# Patient Record
Sex: FEMALE | Race: WHITE | Employment: OTHER | URBAN - METROPOLITAN AREA
[De-identification: names, ages, dates, MRNs, and addresses within clinical notes are randomized per-mention and may not be internally consistent; named-entity substitution may affect disease eponyms.]

---

## 2020-09-09 ENCOUNTER — OFFICE VISIT (OUTPATIENT)
Dept: NEUROLOGY | Age: 70
End: 2020-09-09

## 2020-09-09 ENCOUNTER — OFFICE VISIT (OUTPATIENT)
Dept: NEUROLOGY | Age: 70
End: 2020-09-09
Payer: MEDICARE

## 2020-09-09 VITALS
BODY MASS INDEX: 37.85 KG/M2 | SYSTOLIC BLOOD PRESSURE: 146 MMHG | WEIGHT: 192.8 LBS | TEMPERATURE: 98.1 F | RESPIRATION RATE: 16 BRPM | OXYGEN SATURATION: 96 % | HEART RATE: 82 BPM | DIASTOLIC BLOOD PRESSURE: 92 MMHG | HEIGHT: 60 IN

## 2020-09-09 DIAGNOSIS — Z79.01 CHRONIC ANTICOAGULATION: ICD-10-CM

## 2020-09-09 DIAGNOSIS — R25.9 ABNORMAL INVOLUNTARY MOVEMENT: Primary | ICD-10-CM

## 2020-09-09 DIAGNOSIS — Z86.73 HISTORY OF STROKE: ICD-10-CM

## 2020-09-09 DIAGNOSIS — R26.89 IMBALANCE: ICD-10-CM

## 2020-09-09 DIAGNOSIS — E66.01 SEVERE OBESITY (HCC): ICD-10-CM

## 2020-09-09 DIAGNOSIS — R26.9 GAIT ABNORMALITY: ICD-10-CM

## 2020-09-09 DIAGNOSIS — R41.3 MEMORY LOSS: ICD-10-CM

## 2020-09-09 PROCEDURE — 99205 OFFICE O/P NEW HI 60 MIN: CPT | Performed by: PSYCHIATRY & NEUROLOGY

## 2020-09-09 RX ORDER — TOPIRAMATE 100 MG/1
100 TABLET, FILM COATED ORAL
Qty: 30 TAB | Refills: 3 | Status: SHIPPED | OUTPATIENT
Start: 2020-09-09 | End: 2020-10-12 | Stop reason: SINTOL

## 2020-09-09 RX ORDER — ALENDRONATE SODIUM 70 MG/1
70 TABLET ORAL
COMMUNITY

## 2020-09-09 RX ORDER — TOPIRAMATE 25 MG/1
TABLET ORAL
Qty: 42 TAB | Refills: 0 | Status: SHIPPED | OUTPATIENT
Start: 2020-09-09 | End: 2020-10-12 | Stop reason: DRUGHIGH

## 2020-09-09 RX ORDER — OMEPRAZOLE 10 MG/1
10 CAPSULE, DELAYED RELEASE ORAL 2 TIMES DAILY
COMMUNITY

## 2020-09-09 NOTE — PATIENT INSTRUCTIONS
RESULT POLICY If we have ordered testing for you, know that; \"NO NEWS IS GOOD NEWS! \" It is our policy that we know longer call patients with results, nor do we  give test results over the phone. We schedule follow up appointments so that your results can be discussed in person. This allows you to address any questions you have regarding the results. If you choose to go to an imaging center outside of [facility], it is your responsibility to bring imaging report and disc to follow up appointment. If something of concern is revealed on your test, we will contact you to discuss the matter and if needed schedule a sooner follow up appointment. Additionally, results may be found by using the My Chart feature and one of our patient service representatives at the  can give you instructions on how to access this feature to utilize our electronic medical record system. Thank you for your understanding. PRESCRIPTION REFILL POLICY [facility] Statement to Patients April 1, 2014 In an effort to ensure the large volume of patient prescription refills is processed in the most efficient and expeditious manner, we are asking our patients to assist us by calling your Pharmacy for all prescription refills, this will include also your  Mail Order Pharmacy. The pharmacy will contact our office electronically to continue the refill process. Please do not wait until the last minute to call your pharmacy. We need at least 48 hours (2days) to fill prescriptions. We also encourage you to call your pharmacy before going to  your prescription to make sure it is ready. With regard to controlled substance prescription refill requests (narcotic refills) that need to be picked up at our office, we ask your cooperation by providing us with at least 72 hours (3days) notice that you will need a refill. We will not refill narcotic prescription refill requests after 4:00pm on any weekday, Monday through Thursday, or after 2:00pm on Fridays, or on the weekends. We encourage everyone to explore another way of getting your prescription refill request processed using [employer], our patient web portal through our electronic medical record system. [employer] is an efficient and effective way to communicate your medication request directly to the office and  downloadable as an kiana on your smart phone . [employer] also features a review functionality that allows you to view your medication list as well as leave messages for your physician. Are you ready to get connected? If so please review the attatched instructions or speak to any of our staff to get you set up right away! Thank you so much for your cooperation. Should you have any questions please contact our Practice Administrator.

## 2020-09-09 NOTE — LETTER
9/9/20 Patient: Lisa Murray YOB: 1950 Date of Visit: 9/9/2020 MD Uriel Abdi [address] [id number] VIA In Basket MD Uriel Gonzalezfroilan [address] [address] VIA Facsimile: [phone] Dear MD Audrey Abdi MD, Thank you for referring Ms. Lisa Murray to [facility] for evaluation. My notes for this consultation are attached. If you have questions, please do not hesitate to call me. I look forward to following your patient along with you. Sincerely, Rilla Ganser, MD

## 2020-09-09 NOTE — PROGRESS NOTES
Chief Complaint   Patient presents with    New Patient    Tremors     since 1990 after CVA. had PE in 1999 and tremor worsened.   became very bad on Sat    Cerebrovascular Accident     1990     Started in left arm, now has some tremor in right arm

## 2020-09-09 NOTE — PROGRESS NOTES
[facility] and [address] at [facility] Neurology Clinics at [facility] [address], [address] [facility] [address] [facility], [address] [place] [address]  [phone] Office  [phone] Facsimile           Referring: Dr. Mechelle Younger      Chief Complaint   Patient presents with    New Patient    Tremors     since 1990 after CVA. had PE in 1999 and tremor worsened. became very bad on Sat    Cerebrovascular Accident     200     73-year-old woman comes today for evaluation of tremor. She says that she began having tremor back in 1990 and it was felt to be secondary to a stroke--see my notes below regarding MRI. In any regard she was followed down at [facility]. Around the same time she said of had a seizure. She was started on Dilantin. After she had a pulmonary embolus in 1999 she believes that her tremor got worse. She has been on Inderal for a number of years and that is done fairly well in controlling her tremor. She has been on primidone which actually did not have much effect and may have caused some confusion. In any regard she says Saturday out of the blue all of a sudden with no inciting factor she had the abrupt worsening of her tremor. This is particularly on her left hand. She is left-handed. She is unable to eat or drink without hand. Her writing is severely affected. She spills food. She spills drink. Her right hand is also affected but not as much as the left. She has voice tremor. She did not have any anxiety provoking event. Her medicine tablets did not change color or shape. She did not miss any medicine. She saw Dr. Pawel Downey who also felt that her tremor was worse. Her blood pressure runs on the low side so they did not want to increase her dose of Inderal.  No loss of consciousness but she does say that she is having more difficulty with memory.   She became concerned when her daughter-in-law of 32 years contacted her to let her know that the patient had written a birthday check using the daughter-in-law's maiden name. She is more forgetful. No dangerous behavior. She does not forget to pay bills are paid them twice. She is writing things down. She also feels like she is not walking as well. Her thyroid is off and she has had her thyroid dose adjusted. No chest pain or palpitations. No shortness of breath. No cough fever chills nausea or vomiting    To the electronic medical record finds no recent progress notes. Back in 2010 there is a neurologic consultation by Dr. Vidal Wheeler for alteration of mental status. She at that time was said to be following with Dr. Cuong Olmstead at [facility] for essential tremor and epilepsy. Last visit was said to have been October 27, 2010 and this consultation for hospitalization was November 4, 2010. The patient's Inderal was said to have been stopped October 27 and she was started on primidone half tablet at bedtime with a schedule to titrate up to a dose of 1 tablet twice daily. Dose was unknown i.e. 50 mg or 250 mg. She was on Dilantin 200 mg twice daily with a seizure 8 years prior. On November 1 she was shopping and had uncontrolled shaking of her entire body which was different from typical seizure and this lasted about an hour and a half. She was responsive the entire time. She did not appear to be encephalopathic. MRI EEG and laboratory analyses were ordered. MRI of the brain from November 4, 2010 was normal.  EEG from the same date was normal with a 10-11 cps background. Discharge summary for that stay is in the EMR.   Past Medical History:   Diagnosis Date    Chronic pain     rsd in left foot    GERD (gastroesophageal reflux disease)     Liver disease hx of several pe,s    Other ill-defined conditions(073.20)     essential tremors    Seizures ([address]     2002    Stroke ([address] 1990    Thromboembolus ([address]     pulmary embolism    Thyroid disease hypothriodism       Past Surgical History:   Procedure Laterality Date    HX APPENDECTOMY      HX GYN      cyst on ovaries    HX HEENT      tonsils    HX LAP CHOLECYSTECTOMY         Current Outpatient Medications on File Prior to Visit   Medication Sig Dispense Refill    rivaroxaban (Xarelto) 20 mg tab tablet Take  by mouth daily.  alendronate (Fosamax) 70 mg tablet Take 70 mg by mouth every seven (7) days.  omeprazole (PRILOSEC) 20 mg capsule Take 20 mg by mouth daily.  phenytoin ER (DILANTIN EXTENDED) 100 mg ER capsule Take 100 mg by mouth. Take one cap po in am and 2 caps pm      propranolol (INDERAL) 10 mg tablet Take 10 mg by mouth two (2) times a day. Indications: ESSENTIAL TREMOR      levothyroxine (Synthroid) 125 mcg tablet Take 125 mcg by mouth Daily (before breakfast). Indications: a condition with low thyroid hormone levels      fexofenadine (ALLEGRA) 180 mg tablet Take 180 mg by mouth daily. Indications: ALLERGIC RHINITIS      calcium-vitamin D (CALCIUM 500+D) 500 mg(1,250mg) -200 unit per tablet Take 1 Tab by mouth daily.  rabeprazole (ACIPHEX) 20 mg tablet Take 1 Tab by mouth daily. 60 Tab 2     No current facility-administered medications on file prior to visit.         Allergies   Allergen Reactions    Morphine Anxiety    Penicillin G Swelling    Sulfa (Sulfonamide Antibiotics) Swelling    Valium [Diazepam] Shortness of Breath and Swelling       Social History     Tobacco Use    Smoking status: Never Smoker    Smokeless tobacco: Never Used   Substance Use Topics    Alcohol use: No    Drug use: Not on file       Family History   Problem Relation Age of Onset    Cancer Mother     Heart Disease Father        Review of Systems  Pertinent positives and negatives as noted with remainder of comprehensive review negative      Examination  Visit Vitals  BP (!) 146/92 (BP 1 Location: Right arm, BP Patient Position: Sitting)   Pulse 82   Temp 98.1 °F (36.7 °C)   Resp 16 Ht 5' (1.524 m)   Wt 87.5 kg (192 lb 12.8 oz)   SpO2 96%   BMI 37.65 kg/m²     Pleasant, well appearing. Dress and grooming are appropriate. No scleral icterus is present. Oropharynx is clear. Supple neck without bruit appreciated. Heart regular. Pulses are symmetrical.  No edema in the lower extremities. Neurologically, she is awake, alert, and oriented with normal speech and language. Her cognition is normal.    Intact cranial nerves 2-12. No nystagmus. Visual fields full to confrontation. Disk margins are flat bilaterally. She has normal bulk and tone. She has postural tremor of the outstretched hands left greater than right. There is some variability in her tremor. At times she actually will start to shake all over. She has variability to tremor of her facial muscles. She will sometimes sit on her left hand. She has no pronation or drift. She generates full strength in the upper and lower extremities to direct confrontational testing. Reflexes are symmetrical in the upper and lower extremities bilaterally. No pathologic reflexes are elicited. .  Finger nose finger and rapid alternating movements are normal.  Steady gait. No sensory deficit to primary modalities. Impression/Plan  Essential tremor--worsening suddenly  History of stroke  History of PE  History of seizure on long-term Dilantin  New complaint of memory difficulty  New complaint of gait imbalance    71-year-old lady with essential tremor worsening all of a sudden out of the blue with reported history of stroke and history of PEs/abnormal clotting coagulation and we will go ahead and get an MRI of the brain as well as a carotid Doppler to evaluate. She also has history of seizure on long-term Dilantin since 1990 or 1994. Check EEG as well. Will continue her Inderal.  Add Topamax in a customary fashion titrating up to 100 mg.  Shantel potential side effects, potential benefits and alternatives.   She will follow in about 6 weeks and we will escalate the Topamax accordingly. Rambo Westbrook MD    This note was created using voice recognition software. Despite editing, there may be syntax errors. This note will not be viewable in [address]

## 2020-09-10 ENCOUNTER — DOCUMENTATION ONLY (OUTPATIENT)
Dept: NEUROLOGY | Age: 70
End: 2020-09-10

## 2020-09-10 NOTE — PROCEDURES
EEG:      Date:  09/09/2020    Requesting Physician:  Katie Amaya. MD Awa    An EEG is requested in this 80-year-old woman with abnormal involuntary movements to evaluate for epileptiform abnormality. Medications:  Medications are said to include Xarelto, Fosamax, Prilosec, Topamax, AcipHex, Dilantin, Inderal, Synthroid, and Allegra. This tracing is obtained during the awake state. During wakefulness, the background consists of diffuse low-voltage fast-frequency beta wave activity. Hyperventilation is not performed due to COVID-19 precaution. Intermittent photic stimulation little alters the tracing. Sleep is not attained. Interpretation: This EEG recorded during the awake state is normal.  No epileptiform abnormalities are seen.

## 2020-09-14 ENCOUNTER — HOSPITAL ENCOUNTER (OUTPATIENT)
Dept: MRI IMAGING | Age: 70
Discharge: HOME | End: 2020-09-14
Attending: PSYCHIATRY & NEUROLOGY
Payer: MEDICARE

## 2020-09-14 DIAGNOSIS — R26.89 IMBALANCE: ICD-10-CM

## 2020-09-14 DIAGNOSIS — R25.9 ABNORMAL INVOLUNTARY MOVEMENT: ICD-10-CM

## 2020-09-14 DIAGNOSIS — R41.3 MEMORY LOSS: ICD-10-CM

## 2020-09-14 PROCEDURE — 70551 MRI BRAIN STEM W/O DYE: CPT

## 2020-10-12 ENCOUNTER — OFFICE VISIT (OUTPATIENT)
Dept: NEUROLOGY | Age: 70
End: 2020-10-12
Payer: MEDICARE

## 2020-10-12 VITALS
TEMPERATURE: 97.3 F | HEIGHT: 60 IN | OXYGEN SATURATION: 98 % | BODY MASS INDEX: 37.85 KG/M2 | SYSTOLIC BLOOD PRESSURE: 124 MMHG | HEART RATE: 58 BPM | RESPIRATION RATE: 14 BRPM | DIASTOLIC BLOOD PRESSURE: 76 MMHG | WEIGHT: 192.8 LBS

## 2020-10-12 DIAGNOSIS — Z87.898 HISTORY OF SEIZURE: ICD-10-CM

## 2020-10-12 DIAGNOSIS — G25.0 ESSENTIAL TREMOR: Primary | ICD-10-CM

## 2020-10-12 DIAGNOSIS — Z79.01 CHRONIC ANTICOAGULATION: ICD-10-CM

## 2020-10-12 DIAGNOSIS — T50.905A ADVERSE EFFECT DUE TO CORRECT MEDICINAL SUBSTANCE, PROPERLY GIVEN, INITIAL ENCOUNTER: ICD-10-CM

## 2020-10-12 PROCEDURE — 3017F COLORECTAL CA SCREEN DOC REV: CPT | Performed by: PSYCHIATRY & NEUROLOGY

## 2020-10-12 PROCEDURE — G8427 DOCREV CUR MEDS BY ELIG CLIN: HCPCS | Performed by: PSYCHIATRY & NEUROLOGY

## 2020-10-12 PROCEDURE — 1090F PRES/ABSN URINE INCON ASSESS: CPT | Performed by: PSYCHIATRY & NEUROLOGY

## 2020-10-12 PROCEDURE — G8536 NO DOC ELDER MAL SCRN: HCPCS | Performed by: PSYCHIATRY & NEUROLOGY

## 2020-10-12 PROCEDURE — G8510 SCR DEP NEG, NO PLAN REQD: HCPCS | Performed by: PSYCHIATRY & NEUROLOGY

## 2020-10-12 PROCEDURE — G8417 CALC BMI ABV UP PARAM F/U: HCPCS | Performed by: PSYCHIATRY & NEUROLOGY

## 2020-10-12 PROCEDURE — 1101F PT FALLS ASSESS-DOCD LE1/YR: CPT | Performed by: PSYCHIATRY & NEUROLOGY

## 2020-10-12 PROCEDURE — G8400 PT W/DXA NO RESULTS DOC: HCPCS | Performed by: PSYCHIATRY & NEUROLOGY

## 2020-10-12 PROCEDURE — 99214 OFFICE O/P EST MOD 30 MIN: CPT | Performed by: PSYCHIATRY & NEUROLOGY

## 2020-10-12 RX ORDER — TOPIRAMATE 50 MG/1
50 TABLET, FILM COATED ORAL
Qty: 90 TAB | Refills: 3 | Status: SHIPPED | OUTPATIENT
Start: 2020-10-12 | End: 2022-04-18

## 2020-10-12 RX ORDER — TOPIRAMATE 50 MG/1
TABLET, FILM COATED ORAL
Qty: 30 TAB | Refills: 1 | Status: SHIPPED | OUTPATIENT
Start: 2020-10-12 | End: 2020-12-07

## 2020-10-12 RX ORDER — ERGOCALCIFEROL 1.25 MG/1
50000 CAPSULE ORAL
COMMUNITY
Start: 2020-10-02

## 2020-10-12 NOTE — LETTER
10/12/20 Patient: Lizbeth Ricks YOB: 1950 Date of Visit: 10/12/2020 Laquita Jimenez MD 
[facility] [address] [address] VIA Facsimile: [phone] Dear Laquita Jimenez MD, Thank you for referring Ms. Lizbeth Ricks to [facility] for evaluation. My notes for this consultation are attached. If you have questions, please do not hesitate to call me. I look forward to following your patient along with you. Sincerely, Yue Coley MD

## 2020-10-12 NOTE — PROGRESS NOTES
[facility] Neurology Clinics and [address] at [facility] Neurology Clinics at [address] [facility] [address] [place] [address] Road Critical access hospital0 [address] [facility], [address] [place] [address]   [phone]              Chief Complaint   Patient presents with    Results     dop, mri, eeg    Follow-up     after tpx start     Past Medical History:   Diagnosis Date    Chronic pain     rsd in left foot    GERD (gastroesophageal reflux disease)     Liver disease hx of several pe,s    Other ill-defined conditions(799.47)     essential tremors    Seizures ([address]     2002    Stroke ([address] 1990    Thromboembolus ([address]     pulmary embolism    Thyroid disease     hypothriodism       Current Outpatient Medications   Medication Sig Dispense Refill    rivaroxaban (Xarelto) 20 mg tab tablet Take  by mouth daily.  alendronate (Fosamax) 70 mg tablet Take 70 mg by mouth every seven (7) days.  omeprazole (PRILOSEC) 20 mg capsule Take 20 mg by mouth daily.  topiramate (TOPAMAX) 25 mg tablet 25 mg nightly for 1 week then, 50 mg nightly for 1 week then, 75 mg nightly for 1 week then, 100mg nightly thereafter. (Patient taking differently: Take 50 mg by mouth nightly.) 42 Tab 0    rabeprazole (ACIPHEX) 20 mg tablet Take 1 Tab by mouth daily. 60 Tab 2    phenytoin ER (DILANTIN EXTENDED) 100 mg ER capsule Take 100 mg by mouth. Take one cap po in am and 2 caps pm      propranolol (INDERAL) 10 mg tablet Take 10 mg by mouth two (2) times a day. Indications: ESSENTIAL TREMOR      levothyroxine (Synthroid) 150 mcg tablet Take 150 mcg by mouth Daily (before breakfast). Indications: a condition with low thyroid hormone levels      fexofenadine (ALLEGRA) 180 mg tablet Take 180 mg by mouth daily. Indications: ALLERGIC RHINITIS      calcium-vitamin D (CALCIUM 500+D) 500 mg(1,250mg) -200 unit per tablet Take 1 Tab by mouth daily.       ergocalciferol (ERGOCALCIFEROL) 1,250 mcg (50,000 unit) capsule         Allergies   Allergen Reactions    Morphine Anxiety    Penicillin G Swelling    Sulfa (Sulfonamide Antibiotics) Swelling    Valium [Diazepam] Shortness of Breath and Swelling     Social History     Tobacco Use    Smoking status: Never Smoker    Smokeless tobacco: Never Used   Substance Use Topics    Alcohol use: No    Drug use: Not on file     82-year-old lady returns today for follow-up after her recent consultation with me on September 9 of this year for essential tremor with sudden worsening. She also has history of epilepsy on Dilantin since the 1990s. We sent her for MRI of the brain and that is personally reviewed today and finds age-related small vessel disease. Carotid Doppler without significant stenosis. EEG normal.  We started her on Topamax at the end of that visit. Continue with Inderal.    Today she reports she has seen benefit to her tremor. She could not get up to the 100 mg dose of Topamax. She says when she got up to 75 mg she became quite tired and confused. She became lethargic. She spoke with her pharmacist who told her it was okay to go back down to 50. She feels better but still a bit tired and sluggish. She is noticing tremor in her voice now. Her left hand is worse than the right. She has not been ill. No fever or chills. No nausea or vomiting. No cough. She remains on Dilantin. No seizure. No occult seizure symptom. Review of systems  Pertinent positives and negatives as noted with remainder of comprehensive review negative      Examination  Visit Vitals  /76 (BP 1 Location: Left arm, BP Patient Position: Sitting)   Pulse (!) 58   Temp 97.3 °F (36.3 °C)   Resp 14   Ht 5' (1.524 m)   Wt 87.5 kg (192 lb 12.8 oz)   SpO2 98%   BMI 37.65 kg/m²   Pleasant lady. Awake alert oriented and conversant. Normal speech and language. Normal cognitive function. No icterus. No nystagmus.   Postural tremor of the outstretched hands left greater than right. Vocal tremor. No ataxia. Steady gait. Impression/Plan  46-year-old lady with essential tremor and we discussed her somnolence with higher doses of Topamax. Continue with 50 mg nightly. Discussed that we could go up higher at a later date once she acclimates a bit more. Adverse effect to Topamax--as above    History of seizure with normal EEG. Continue with Dilantin. Chronic anticoagulation--Per primary    Shelley Covington MD      This note was created using voice recognition software. Despite editing, there may be syntax errors. This note will not be viewable in [address]

## 2020-10-12 NOTE — PROGRESS NOTES
Chief Complaint   Patient presents with    Results     dop, mri, eeg    Follow-up     after tpx start     Did not start the 100 mg dose as even taking 50 mg will cause pt to be drowsy. When started on 75 mg, could not fxn.   The 50 mg does help some with tremor

## 2020-12-07 RX ORDER — TOPIRAMATE 50 MG/1
TABLET, FILM COATED ORAL
Qty: 30 TAB | Refills: 0 | Status: SHIPPED | OUTPATIENT
Start: 2020-12-07 | End: 2022-04-18

## 2022-03-19 PROBLEM — E66.01 SEVERE OBESITY (HCC): Status: ACTIVE | Noted: 2020-09-09

## 2022-04-18 RX ORDER — MONTELUKAST SODIUM 10 MG/1
10 TABLET ORAL EVERY EVENING
COMMUNITY

## 2022-04-18 NOTE — PERIOP NOTES
[facility]  Endoscopy Preprocedure Instructions      1. On the day of your surgery, please report to registration located on the 2nd floor of the  [facility]. yes    2. You must have a responsible adult to drive you to the hospital, stay at the hospital during your procedure and drive you home. If they leave your procedure will not be started (no exceptions). yes    3. Do not have anything to eat or drink (including water, gum, mints, coffee, and juice) after midnight. This does not apply to the medications you were instructed to take by your physician. yesIf you are currently taking Plavix, Coumadin, Aspirin, or other blood-thinning agents, contact your physician for special instructions. Yes,eliquis taken 4/17. Will hold until procedure time. 4. If you are having a procedure that requires bowel prep: We recommend that you have only clear liquids the day before your procedure and begin your bowel prep by 5:00 pm.  You may continue to drink clear liquids until midnight. If for any reason you are not able to complete your prep please notify your physician immediately. yes    5. Have a list of all current medications, including vitamins, herbal supplements and any other over the counter medications. yes    6. If you wear glasses, contacts, dentures and/or hearing aids, they may be removed prior to procedure, please bring a case to store them in. Yes      Pacemaker request sent to Dr. Erlinda Brush.    7. You should understand that if you do not follow these instructions your procedure may be cancelled. If your physical condition changes (I.e. fever, cold or flu) please contact your doctor as soon as possible. 8. It is important that you be on time.   If for any reason you are unable to keep your appointment please call )- the day of or your physicians office prior to your scheduled procedure

## 2022-04-20 ENCOUNTER — ANESTHESIA EVENT (OUTPATIENT)
Dept: ENDOSCOPY | Age: 72
End: 2022-04-20
Payer: MEDICARE

## 2022-04-21 NOTE — ANESTHESIA PREPROCEDURE EVALUATION
Relevant Problems   ENDOCRINE   (+) Severe obesity (HCC)       Anesthetic History     PONV          Review of Systems / Medical History  Patient summary reviewed, nursing notes reviewed and pertinent labs reviewed    Pulmonary  Within defined limits                 Neuro/Psych     seizures  CVA (left hand tremor)       Cardiovascular              Pacemaker         GI/Hepatic/Renal     GERD           Endo/Other      Hypothyroidism       Other Findings   Comments: History of PE, anticoagulated on eliquis, stopped 4 days ago         Physical Exam    Airway  Mallampati: II  TM Distance: 4 - 6 cm  Neck ROM: normal range of motion   Mouth opening: Normal     Cardiovascular    Rhythm: regular  Rate: normal         Dental    Dentition: Lower dentition intact and Upper dentition intact     Pulmonary  Breath sounds clear to auscultation               Abdominal         Other Findings            Anesthetic Plan    ASA: 3  Anesthesia type: MAC          Induction: Intravenous  Anesthetic plan and risks discussed with: Patient

## 2022-04-22 ENCOUNTER — ANESTHESIA (OUTPATIENT)
Dept: ENDOSCOPY | Age: 72
End: 2022-04-22
Payer: MEDICARE

## 2022-04-22 ENCOUNTER — HOSPITAL ENCOUNTER (OUTPATIENT)
Age: 72
Setting detail: OUTPATIENT SURGERY
Discharge: HOME | End: 2022-04-22
Attending: SPECIALIST | Admitting: SPECIALIST
Payer: MEDICARE

## 2022-04-22 VITALS
TEMPERATURE: 97.8 F | BODY MASS INDEX: 32.58 KG/M2 | HEART RATE: 62 BPM | DIASTOLIC BLOOD PRESSURE: 68 MMHG | SYSTOLIC BLOOD PRESSURE: 140 MMHG | WEIGHT: 183.86 LBS | OXYGEN SATURATION: 97 % | HEIGHT: 63 IN | RESPIRATION RATE: 19 BRPM

## 2022-04-22 PROCEDURE — 74011000258 HC RX REV CODE- 258: Performed by: NURSE ANESTHETIST, CERTIFIED REGISTERED

## 2022-04-22 PROCEDURE — 88305 TISSUE EXAM BY PATHOLOGIST: CPT

## 2022-04-22 PROCEDURE — 74011000250 HC RX REV CODE- 250: Performed by: NURSE ANESTHETIST, CERTIFIED REGISTERED

## 2022-04-22 PROCEDURE — 76060000031 HC ANESTHESIA FIRST 0.5 HR: Performed by: SPECIALIST

## 2022-04-22 PROCEDURE — 2709999900 HC NON-CHARGEABLE SUPPLY: Performed by: SPECIALIST

## 2022-04-22 PROCEDURE — 76040000019: Performed by: SPECIALIST

## 2022-04-22 PROCEDURE — 74011250636 HC RX REV CODE- 250/636: Performed by: NURSE ANESTHETIST, CERTIFIED REGISTERED

## 2022-04-22 PROCEDURE — 77030013992 HC SNR POLYP ENDOSC BSC -B: Performed by: SPECIALIST

## 2022-04-22 RX ORDER — LIDOCAINE HYDROCHLORIDE 20 MG/ML
INJECTION, SOLUTION EPIDURAL; INFILTRATION; INTRACAUDAL; PERINEURAL AS NEEDED
Status: DISCONTINUED | OUTPATIENT
Start: 2022-04-22 | End: 2022-04-22 | Stop reason: HOSPADM

## 2022-04-22 RX ORDER — SODIUM CHLORIDE 9 MG/ML
INJECTION, SOLUTION INTRAVENOUS
Status: DISCONTINUED | OUTPATIENT
Start: 2022-04-22 | End: 2022-04-22 | Stop reason: HOSPADM

## 2022-04-22 RX ORDER — FENTANYL CITRATE 50 UG/ML
25 INJECTION, SOLUTION INTRAMUSCULAR; INTRAVENOUS AS NEEDED
Status: DISCONTINUED | OUTPATIENT
Start: 2022-04-22 | End: 2022-04-22 | Stop reason: HOSPADM

## 2022-04-22 RX ORDER — NALOXONE HYDROCHLORIDE 0.4 MG/ML
0.4 INJECTION, SOLUTION INTRAMUSCULAR; INTRAVENOUS; SUBCUTANEOUS
Status: DISCONTINUED | OUTPATIENT
Start: 2022-04-22 | End: 2022-04-22 | Stop reason: HOSPADM

## 2022-04-22 RX ORDER — PROPOFOL 10 MG/ML
INJECTION, EMULSION INTRAVENOUS
Status: DISCONTINUED | OUTPATIENT
Start: 2022-04-22 | End: 2022-04-22 | Stop reason: HOSPADM

## 2022-04-22 RX ORDER — DEXTROMETHORPHAN/PSEUDOEPHED 2.5-7.5/.8
1.2 DROPS ORAL
Status: DISCONTINUED | OUTPATIENT
Start: 2022-04-22 | End: 2022-04-22 | Stop reason: HOSPADM

## 2022-04-22 RX ORDER — SODIUM CHLORIDE 9 MG/ML
50 INJECTION, SOLUTION INTRAVENOUS CONTINUOUS
Status: DISCONTINUED | OUTPATIENT
Start: 2022-04-22 | End: 2022-04-22 | Stop reason: HOSPADM

## 2022-04-22 RX ORDER — EPHEDRINE SULFATE/0.9% NACL/PF 50 MG/5 ML
SYRINGE (ML) INTRAVENOUS AS NEEDED
Status: DISCONTINUED | OUTPATIENT
Start: 2022-04-22 | End: 2022-04-22 | Stop reason: HOSPADM

## 2022-04-22 RX ORDER — PROPOFOL 10 MG/ML
INJECTION, EMULSION INTRAVENOUS AS NEEDED
Status: DISCONTINUED | OUTPATIENT
Start: 2022-04-22 | End: 2022-04-22 | Stop reason: HOSPADM

## 2022-04-22 RX ADMIN — PROPOFOL 150 MCG/KG/MIN: 10 INJECTION, EMULSION INTRAVENOUS at 08:59

## 2022-04-22 RX ADMIN — LIDOCAINE HYDROCHLORIDE 40 MG: 20 INJECTION, SOLUTION INTRAVENOUS at 08:59

## 2022-04-22 RX ADMIN — PROPOFOL 70 MG: 10 INJECTION, EMULSION INTRAVENOUS at 08:59

## 2022-04-22 RX ADMIN — SODIUM CHLORIDE: 9 INJECTION, SOLUTION INTRAVENOUS at 08:56

## 2022-04-22 RX ADMIN — Medication 10 MG: at 09:09

## 2022-04-22 NOTE — ANESTHESIA POSTPROCEDURE EVALUATION
Procedure(s):  COLONOSCOPY  ENDOSCOPIC POLYPECTOMY. MAC    Anesthesia Post Evaluation        Patient location during evaluation: PACU  Level of consciousness: awake  Pain management: adequate  Airway patency: patent  Anesthetic complications: no  Cardiovascular status: acceptable  Respiratory status: acceptable  Hydration status: acceptable  Post anesthesia nausea and vomiting:  none      INITIAL Post-op Vital signs:   Vitals Value Taken Time   /69 04/22/22 0936   Temp     Pulse 63 04/22/22 0939   Resp 20 04/22/22 0939   SpO2 98 % 04/22/22 0939   Vitals shown include unvalidated device data.

## 2022-04-22 NOTE — PROGRESS NOTES
Endoscopy discharge instructions have been reviewed and given to patient. The patient verbalized understanding and acceptance of instructions. Dr. Jose Weir discussed with friends procedure findings and next steps.

## 2022-04-22 NOTE — H&P
Date: 2022 2:30 PM  Patient Name: Wanda Bowman  Account #: [id number]  Gender: Female   (age): 1950 (73)  Provider:    Olga Whitmore. Jose Weir MD     Referring Physician:    Hubert Campbell. Cirilo Night, [address] [facility] [address]  [phone] (phone)  [phone] (fax)     Chief Complaint:    I need a colonsocopy     History of Present Illness:  Anticoagulated for remote (> 5yrs) history of PE. Has stopped anticoagulation for procedures in past and Dr. Sigrid James has authorized such. Indication for colonsocopy screening, last performed colonoscopy (negative, , Jose Weir). She agreed to schedule screening colonoscopy. Past Medical History  Medical Conditions:   Chest pain? Pacemaker  Seizures  Shingles  Stroke, Heart Attack, or Seizure?   Thyroid disease  Thyroid problems  Surgical Procedures:   Gallbladder surgery,   Appendectomy, 1965  History of anesthesia complications:  Dx Studies:   CAT Scan,   Colonoscopy, 13  Endoscopy  Medications:   albuterol sulfate 0.63 mg/3 mL every four hours as needed  Allegra 180 mg Take 1 tablet by mouth once a day  Brovana 15 mcg/2 mL twice a day as directed  Dilantin Extended 100 mg Take 1 capsule by mouth three times a day as directed  Fosamax 70 mg Take 1 tablet by mouth once a week as directed  levothyroxine 125 mcg Take 1 tablet by mouth once a day as directed  omeprazole 20 mg Take 1 tablet by mouth twice a day as directed  propranolol 10 mg Take 1 tablet by mouth every night as directed  Pulmicort 0.5 mg/2 mL twice a day as needed  Xarelto 20 mg Take 1 tablet by mouth once a day as directed  Allergies:   morphine  Penicillins  Sulfa(Sulfonamide Antibiotics)  Valium  Immunizations:   Influenza, seasonal, injectable, 10/01/2021  COVID Vaccine,   Influenza, seasonal, injectable, 10/01/2020  pneumococcal polysaccharide PPV23  Social History  Alcohol:   None  Tobacco:   Never smoker  Drugs:   None  Exercise:   None  Caffeine:   Coffee or Tea # of cups per day: Carter Belle Marital Status:         Occupation:    housewife     Review of Systems:  Cardiovascular: Denies chest pain, irregular heart beat, palpitations, peripheral edema, syncope, Sweats. Constitutional: Denies fatigue, fever, loss of appetite, weight gain, weight loss. ENMT: Presents suffers from hearing loss. Denies nose bleeds, sore throat. Endocrine: Denies excessive thirst, heat intolerance. Eyes: Denies loss of vision. Gastrointestinal: Presents suffers from Bloating/gas, Stool incontinence. Denies abdominal pain, abdominal swelling, change in bowel habits, constipation, diarrhea, heartburn, jaundice, nausea, rectal bleeding, stomach cramps, vomiting, dysphagia, rectal pain, hematemesis. Genitourinary: Denies dark urine, dysuria, frequent urination, hematuria, incontinence. Hematologic/Lymphatic: Presents suffers from easy bruising. Denies prolonged bleeding. Integumentary: Denies itching, rashes, sun sensitivity. Musculoskeletal: Presents suffers from back pain, joint pain, stiffness. Denies arthritis, gout, muscle weakness. Neurological: Denies dizziness, fainting, frequent headaches, memory loss. Psychiatric: Denies anxiety, depression, difficulty sleeping, hallucinations, nervousness, panic attacks, paranoia. Respiratory: Denies cough, dyspnea, wheezing. Vital Signs:  BP  (mmHg)  Pulse  (bpm) Rhythm Weight (lbs/oz) Height (ft/in) BMI Temp  128/78 89 Regular 184 / 5 / 2 33.65 98.1 (F)  Physical Exam:  Constitutional:  Appearance: No distress, appears comfortable. Communication: Understands/receives spoken information. Skin:  Inspection: No rash, no jaundice. Head/face: Inspection: Normacephalic, atraumatic. Eyes:  Conjunctivae/lids: Normal.  Pupils/irises: Pupils equal, round and normal.  ENMT:  External: Normal.  Hearing: Normal.  Neck:  Neck: Normal appearance, trachea midline.   Jugular veins: No JVD noted.  Respiratory:  Effort: Normal respiratory effort, comfortable, speaks in complete sentences. Lab Results:   No Electronic Results  Impressions:   Screening colonoscopy (Screening for colonic neoplasia)  Plan:   Education handout on BMI Healthy Weight  Colonoscopy - HOLD XARELTO 2 DAYS PRIOR  Suprep Bowel Prep Kit 17.5-3.13-1.6 gram Take as directed  Risk & Medical Necessity:    The level of medical decision making for this visit is moderate. The number and complexity of problems addressed are moderate. The risk of complications and/or morbidity or mortality of patient management is moderate. Tori Corley. Kitty Yusuf MD    Electronically signed on 2022 2:51:09 PM by Tori Corley. Kitty Yusuf MD  [address] [facility].  Noah Loaiza, MRN [id number],  1950 First Visit,

## 2022-04-22 NOTE — PERIOP NOTES
Received recovery report from Anesthesia team, see anesthesia note. ABD remains soft and non-tender post procedure. Pt has no complaints at this time and tolerated the procedure well. Endoscope was pre-cleaned at bedside immediately following procedure by Stephen Max. Post recovery report given to [address] [place] [address].

## 2022-04-22 NOTE — DISCHARGE INSTRUCTIONS
[address] [facility] CHRISTINE Mead MD  [phone]      April 22, 2022    Salina Bustos  YOB: 1950    COLONOSCOPY DISCHARGE INSTRUCTIONS    If there is redness at IV site you should apply warm compress to area. If redness or soreness persist contact Dr. Jocelyn Mead' or your primary care doctor. There may be a slight amount of blood passed from the rectum. Gaseous discomfort may develop, but walking, belching will help relieve this. You may not operate a vehicle for 12 hours  You may not operate machinery or dangerous appliances for rest of today  You may not drink alcoholic beverages for 12 hours  Avoid making any critical decisions for 24 hours    DIET:  You may resume your normal diet, but some patients find that heavy or large meals may lead to indigestion or vomiting. I suggest a light meal as first food intake. MEDICATIONS:  The use of some over-the-counter pain medication may lead to bleeding after colon biopsies or polyp removal.  Tylenol (also called acetaminophen) is safe to take even if you have had colonoscopy with polyp removal.  Based on the procedure you had today you may not safely take aspirin or aspirin-like products for the next ten (10) days. Remember that Tylenol (also called acetaminophen) is safe to take after colonoscopy even if you have had biopsies or polyps removed. ACTIVITY:  You may resume your normal household activities, but it is recommended that you spend the remainder of the day resting -  avoid any strenuous activity. CALL DR. King Bean' OFFICE IF:  Increasing pain, nausea, vomiting  Abdominal distension (swelling)  Significant new or increased bleeding (oral or rectal)  Fever/Chills  Chest pain/shortness of breath                       Additional instructions:   No aspirin 10 days, restart xarelto on Sunday if you see no bleeding in that time period.   We found and removed three small polyps, but no colon cancer - good news. I'll contact you with the polyp results by letter in 10-14 days. It was an honor to be your doctor today. Please let me or my office staff know if you have any feedback about today's procedure. Gokul Turpin MD    Colonoscopy saves lives, and can prevent colon cancer. Everyone aged 48 or older needs colonoscopy.   Tell your family and friends: get the test!

## 2022-04-22 NOTE — INTERVAL H&P NOTE
Pre-Endoscopy H&P Update  Chief complaint/HPI/ROS:  The indication for the procedure, the patient's history and the patient's current medications are reviewed prior to the procedure and that data is reported on the H&P to which this document is attached. Any significant complaints with regard to organ systems will be noted, and if not mentioned then a review of systems is not contributory. Past Medical History:   Diagnosis Date    Chronic pain     rsd in left foot    GERD (gastroesophageal reflux disease)     Liver disease hx of several pe,s    Other ill-defined conditions(799.89)     essential tremors    Seasonal allergic rhinitis     Seizures ([address]     2002    Stroke ([address] 1990    Thromboembolus ([address]     pulmary embolism    Thyroid disease     hypothriodism      Past Surgical History:   Procedure Laterality Date    HX APPENDECTOMY      HX GYN      cyst on ovaries    HX HEENT      tonsils    HX HERNIA REPAIR  2012    hiatal hernia repair    JW    HX LAP CHOLECYSTECTOMY      HX PACEMAKER  07/2021    SARAH or Tr Cabrera History     Tobacco Use    Smoking status: Never Smoker    Smokeless tobacco: Never Used   Substance Use Topics    Alcohol use: No      Family History   Problem Relation Age of Onset    Cancer Mother     Heart Disease Father       Allergies   Allergen Reactions    Morphine Anxiety    Penicillin G Swelling    Sulfa (Sulfonamide Antibiotics) Swelling    Valium [Diazepam] Shortness of Breath and Swelling      Prior to Admission Medications   Prescriptions Last Dose Informant Patient Reported? Taking? alendronate (Fosamax) 70 mg tablet 4/20/2022  Yes Yes   Sig: Take 70 mg by mouth every seven (7) days. calcium-vitamin D (CALCIUM 500+D) 500 mg(1,250mg) -200 unit per tablet 4/20/2022  Yes Yes   Sig: Take 1 Tab by mouth daily. ergocalciferol (ERGOCALCIFEROL) 1,250 mcg (50,000 unit) capsule 4/20/2022  Yes Yes   Sig: Take 50,000 Units by mouth every seven (7) days. fexofenadine (ALLEGRA) 180 mg tablet 4/22/2022 at Unknown time  Yes Yes   Sig: Take 180 mg by mouth daily. Indications: ALLERGIC RHINITIS   levothyroxine (Synthroid) 150 mcg tablet 4/22/2022 at Unknown time  Yes Yes   Sig: Take 150 mcg by mouth Daily (before breakfast). Indications: a condition with low thyroid hormone levels   montelukast (Singulair) 10 mg tablet 4/21/2022 at Unknown time  Yes Yes   Sig: Take 10 mg by mouth every evening. Indications: seasonal runny nose   omeprazole (PRILOSEC) 10 mg capsule 4/22/2022 at Unknown time  Yes Yes   Sig: Take 10 mg by mouth two (2) times a day. phenytoin ER (DILANTIN EXTENDED) 100 mg ER capsule 4/22/2022 at Unknown time  Yes Yes   Sig: Take 100 mg by mouth. Take one cap po in am and 2 caps pm  Indications: for lingering effects of previous PE   propranolol (INDERAL) 10 mg tablet 4/22/2022 at Unknown time  Yes Yes   Sig: Take 10 mg by mouth two (2) times a day. Indications: ESSENTIAL TREMOR   rabeprazole (ACIPHEX) 20 mg tablet Not Taking at Unknown time  No No   Sig: Take 1 Tab by mouth daily. Patient not taking: Reported on 4/18/2022   rivaroxaban (Xarelto) 20 mg tab tablet 4/17/2022  Yes Yes   Sig: Take  by mouth daily. Facility-Administered Medications: None       PHYSICAL EXAM:  The patient is examined immediately prior to the procedure. Visit Vitals  BP (!) 143/83   Pulse 69   Temp 98.1 °F (36.7 °C)   Resp 19   Ht 5' 3\" (1.6 m)   Wt 83.4 kg (183 lb 13.8 oz)   SpO2 100%   Breastfeeding No   BMI 32.57 kg/m²     Gen: Appears comfortable, no distress. Pulm: comfortable respirations with no abnormal audible breath sounds  HEART: well perfused, no abnormal audible heart sounds  GI: abdomen flat. PLAN:  Informed consent discussion held, patient afforded an opportunity to ask questions and all questions answered. After being advised of the risks, benefits, and alternatives, the patient requested that we proceed and indicated so on a written consent form. Will proceed with procedure as planned.   Tracie Syed MD

## 2022-04-22 NOTE — PROGRESS NOTES
Rupali Art  1950  [id number]    Situation:  Verbal report received from: RALEIGH Webb  Procedure: Procedure(s):  COLONOSCOPY  ENDOSCOPIC POLYPECTOMY    Background:    Preoperative diagnosis: Screen for colon cancer [Z12.11]  Postoperative diagnosis: Transverse Colon polyps  Rectal Polyp  Diverticulosis    :  Dr. Norris Pump  Assistant(s): Endoscopy Technician-1: Matheus Christianson  Endoscopy RN-1: Jessica Patel RN    Specimens:   ID Type Source Tests Collected by Time Destination   1 : Transverse Colon Polyps Preservative Colon, Transverse  Monique Sanchez MD 4/22/2022 1406 Pathology   2 : Rectal Polyp Preservative Rectum  Monique Sanchez MD 4/22/2022 3251 Pathology     H. Pylori  no    Assessment:  Intra-procedure medications     Anesthesia gave intra-procedure sedation and medications, see anesthesia flow sheet yes    Intravenous fluids: NS@ KVO     Vital signs stable yes      Abdominal assessment: round and soft yes    Recommendation:  Discharge patient per MD Scout Emerson.   Return to [address] [place] [address] [address] or Friend family  Permission to share finding with family or friend yes

## 2022-04-26 NOTE — PROCEDURES
[address] [facility] CHRISTINE Hicks MD  [phone]      2022  (delayed documentation, performed ; completed )  Colonoscopy Procedure Note  Cleotis Brittle  :  1950  [place] Medical Record Number: [id number]    Indications:     Screening colonoscopy  PCP:  Yancy Orourke MD  Anesthesia/Sedation: Conscious Sedation/Moderate Sedation/GETA, see notes  Endoscopist:  Dr. Rika Hagen  Complications:  None  Estimated Blood Loss:  None    Permit:  The indications, risks, benefits and alternatives were reviewed with the patient or their decision maker who was provided an opportunity to ask questions and all questions were answered. The specific risks of colonoscopy with conscious sedation were reviewed, including but not limited to anesthetic complication, bleeding, adverse drug reaction, missed lesion, infection, IV site reactions, and intestinal perforation which would lead to the need for surgical repair. Alternatives to colonoscopy including radiographic imaging, observation without testing, or laboratory testing were reviewed including the limitations of those alternatives. After considering the options and having all their questions answered, the patient or their decision maker provided both verbal and written consent to proceed. Procedure in Detail:  After obtaining informed consent, positioning of the patient in the left lateral decubitus position, and conduction of a pre-procedure pause or \"time out\" the endoscope was introduced into the anus and advanced to the cecum, which was identified by the ileocecal valve and appendiceal orifice. The quality of the colonic preparation was good. A careful inspection was made as the colonoscope was withdrawn, findings and interventions are described below.     Findings:   Two transverse colon polyps and one rectal polyp all <10mm, all removed with cold snare, retrieved, and hemostasis confirmed. Specimens:    See above    Complications:   None; patient tolerated the procedure well. Impression:  Colon polyps three    Recommendations:     - Await pathology. Thank you for entrusting me with this patient's care. Please do not hesitate to contact me with any questions or if I can be of assistance with any of your other patients' GI needs. Signed By: Odilon Machado MD                        April 26, 2022      Surgical assistant none. Implants none unless specified.

## 2022-07-06 ENCOUNTER — TRANSCRIBE ORDER (OUTPATIENT)
Dept: SCHEDULING | Age: 72
End: 2022-07-06

## 2022-07-06 DIAGNOSIS — H35.50 OPTIC ATROPHY ASSOCIATED WITH RETINAL DYSTROPHIES: Primary | ICD-10-CM

## 2022-07-06 DIAGNOSIS — H47.20 OPTIC ATROPHY ASSOCIATED WITH RETINAL DYSTROPHIES: Primary | ICD-10-CM

## 2022-07-06 DIAGNOSIS — H53.40 VISUAL FIELD DEFECTS: ICD-10-CM

## 2022-07-13 ENCOUNTER — TRANSCRIBE ORDER (OUTPATIENT)
Dept: SCHEDULING | Age: 72
End: 2022-07-13

## 2022-07-13 DIAGNOSIS — H47.293 PALLOR, TEMPORAL, OPTIC DISC, BILATERAL: Primary | ICD-10-CM

## 2022-07-26 ENCOUNTER — HOSPITAL ENCOUNTER (OUTPATIENT)
Dept: CT IMAGING | Age: 72
Discharge: HOME | End: 2022-07-26
Attending: STUDENT IN AN ORGANIZED HEALTH CARE EDUCATION/TRAINING PROGRAM
Payer: MEDICARE

## 2022-07-26 DIAGNOSIS — H47.293 PALLOR, TEMPORAL, OPTIC DISC, BILATERAL: ICD-10-CM

## 2022-07-26 LAB — CREAT BLD-MCNC: 0.9 MG/DL (ref 0.6–1.3)

## 2022-07-26 PROCEDURE — 74011000636 HC RX REV CODE- 636

## 2022-07-26 PROCEDURE — 70481 CT ORBIT/EAR/FOSSA W/DYE: CPT

## 2022-07-26 PROCEDURE — 82565 ASSAY OF CREATININE: CPT

## 2022-07-26 PROCEDURE — 70470 CT HEAD/BRAIN W/O & W/DYE: CPT

## 2022-07-26 RX ADMIN — IOPAMIDOL 100 ML: 612 INJECTION, SOLUTION INTRAVENOUS at 14:19

## 2023-07-26 ENCOUNTER — OFFICE VISIT (OUTPATIENT)
Age: 73
End: 2023-07-26
Payer: MEDICARE

## 2023-07-26 VITALS
HEART RATE: 69 BPM | RESPIRATION RATE: 16 BRPM | BODY MASS INDEX: 32.95 KG/M2 | OXYGEN SATURATION: 99 % | WEIGHT: 186 LBS | SYSTOLIC BLOOD PRESSURE: 145 MMHG | DIASTOLIC BLOOD PRESSURE: 81 MMHG

## 2023-07-26 DIAGNOSIS — R25.9 MIXED ACTION AND RESTING TREMOR: Primary | ICD-10-CM

## 2023-07-26 DIAGNOSIS — R56.9 SEIZURE-LIKE ACTIVITY (HCC): ICD-10-CM

## 2023-07-26 DIAGNOSIS — H53.9 VISUAL DISTURBANCE: ICD-10-CM

## 2023-07-26 PROCEDURE — 99214 OFFICE O/P EST MOD 30 MIN: CPT | Performed by: PSYCHIATRY & NEUROLOGY

## 2023-07-26 PROCEDURE — 1123F ACP DISCUSS/DSCN MKR DOCD: CPT | Performed by: PSYCHIATRY & NEUROLOGY

## 2023-07-26 PROCEDURE — 1090F PRES/ABSN URINE INCON ASSESS: CPT | Performed by: PSYCHIATRY & NEUROLOGY

## 2023-07-26 PROCEDURE — 3017F COLORECTAL CA SCREEN DOC REV: CPT | Performed by: PSYCHIATRY & NEUROLOGY

## 2023-07-26 PROCEDURE — G8400 PT W/DXA NO RESULTS DOC: HCPCS | Performed by: PSYCHIATRY & NEUROLOGY

## 2023-07-26 PROCEDURE — G8427 DOCREV CUR MEDS BY ELIG CLIN: HCPCS | Performed by: PSYCHIATRY & NEUROLOGY

## 2023-07-26 PROCEDURE — G8417 CALC BMI ABV UP PARAM F/U: HCPCS | Performed by: PSYCHIATRY & NEUROLOGY

## 2023-07-26 PROCEDURE — 1036F TOBACCO NON-USER: CPT | Performed by: PSYCHIATRY & NEUROLOGY

## 2023-07-26 NOTE — PROGRESS NOTES
[facility] and [address] [facility] Shasta Dianelys at [facility]ics at [address] [place] [address], [address] [place] [address]   [phone]              Chief Complaint   Patient presents with    Tremors     Worsening. Now affecting voice, and right hand     Current Outpatient Medications   Medication Sig Dispense Refill    alendronate (FOSAMAX) 70 MG tablet Take 1 tablet by mouth every 7 days      Calcium Carbonate-Vitamin D (OYSTER SHELL CALCIUM/D) 500-5 MG-MCG TABS Take 1 tablet by mouth daily      fexofenadine (ALLEGRA) 180 MG tablet Take 1 tablet by mouth daily      levothyroxine (SYNTHROID) 150 MCG tablet Take 1 tablet by mouth every morning (before breakfast)      montelukast (SINGULAIR) 10 MG tablet Take 1 tablet by mouth every evening      omeprazole (PRILOSEC) 10 MG delayed release capsule Take 1 capsule by mouth 2 times daily      phenytoin (DILANTIN) 100 MG ER capsule Take 1 capsule by mouth 2 times daily      propranolol (INDERAL) 10 MG tablet Take 1 tablet by mouth 2 times daily      RABEprazole (ACIPHEX) 20 MG tablet Take 1 tablet by mouth daily      rivaroxaban (XARELTO) 20 MG TABS tablet Take by mouth daily      ergocalciferol (ERGOCALCIFEROL) 1.25 MG (99194 UT) capsule Take 1 capsule by mouth every 7 days (Patient not taking: Reported on 7/26/2023)       No current facility-administered medications for this visit. Allergies   Allergen Reactions    Diazepam Shortness Of Breath and Swelling    Penicillin G Swelling    Sulfa Antibiotics Swelling    Morphine Anxiety     Social History     Tobacco Use    Smoking status: Never    Smokeless tobacco: Never   Vaping Use    Vaping Use: Never used   Substance Use Topics    Alcohol use: No    Drug use: Never     81-year-old lady presents for follow-up today regarding essential tremor.   I saw her last October 2020 and we continued with low-dose

## 2023-08-01 ENCOUNTER — PROCEDURE VISIT (OUTPATIENT)
Age: 73
End: 2023-08-01

## 2023-08-01 DIAGNOSIS — H53.9 VISUAL DISTURBANCE: Primary | ICD-10-CM

## 2023-08-14 ENCOUNTER — TELEPHONE (OUTPATIENT)
Age: 73
End: 2023-08-14

## 2023-08-14 NOTE — TELEPHONE ENCOUNTER
Patient would like a call regarding her medication Carbidopa-Levodopa due to it making her very sick on the stomach. She wants to know should she continue to take it or should she take something else?     Please contact

## 2023-08-23 ENCOUNTER — OFFICE VISIT (OUTPATIENT)
Age: 73
End: 2023-08-23
Payer: MEDICARE

## 2023-08-23 VITALS
SYSTOLIC BLOOD PRESSURE: 136 MMHG | OXYGEN SATURATION: 98 % | RESPIRATION RATE: 16 BRPM | DIASTOLIC BLOOD PRESSURE: 83 MMHG | HEART RATE: 89 BPM

## 2023-08-23 DIAGNOSIS — R41.3 MEMORY DIFFICULTIES: ICD-10-CM

## 2023-08-23 DIAGNOSIS — R25.9 MIXED ACTION AND RESTING TREMOR: Primary | ICD-10-CM

## 2023-08-23 PROCEDURE — 1036F TOBACCO NON-USER: CPT | Performed by: PSYCHIATRY & NEUROLOGY

## 2023-08-23 PROCEDURE — G8400 PT W/DXA NO RESULTS DOC: HCPCS | Performed by: PSYCHIATRY & NEUROLOGY

## 2023-08-23 PROCEDURE — 1123F ACP DISCUSS/DSCN MKR DOCD: CPT | Performed by: PSYCHIATRY & NEUROLOGY

## 2023-08-23 PROCEDURE — G8417 CALC BMI ABV UP PARAM F/U: HCPCS | Performed by: PSYCHIATRY & NEUROLOGY

## 2023-08-23 PROCEDURE — 3017F COLORECTAL CA SCREEN DOC REV: CPT | Performed by: PSYCHIATRY & NEUROLOGY

## 2023-08-23 PROCEDURE — 1090F PRES/ABSN URINE INCON ASSESS: CPT | Performed by: PSYCHIATRY & NEUROLOGY

## 2023-08-23 PROCEDURE — G8427 DOCREV CUR MEDS BY ELIG CLIN: HCPCS | Performed by: PSYCHIATRY & NEUROLOGY

## 2023-08-23 PROCEDURE — 99214 OFFICE O/P EST MOD 30 MIN: CPT | Performed by: PSYCHIATRY & NEUROLOGY

## 2023-08-23 RX ORDER — TOPIRAMATE 25 MG/1
TABLET ORAL
Qty: 60 TABLET | Refills: 5 | Status: SHIPPED | OUTPATIENT
Start: 2023-08-23

## 2023-08-23 NOTE — PATIENT INSTRUCTIONS
Neurocognitive consult/testing procedure:    Please contact office after scheduling with one of the facilities listed below with whom you are seeing, date and time of appt and we will fax orders and notes tho that facility and schedule your follow up with our office after testing. [facility] Neurology at [facility]  Dr. Ana Singleton  [address] [facility] [address]  (V) [address] Neurology at [facility]  Dr. Neto Tadeo Box  [address] [place] [address] Drive [address] [facility]  [address] [place] [address] (k) [phone]    [email or web address]  Dr. Davian More [address]  (k) [phone]  (h) [phone]    [facility] Neuropsychology [email or web address]  Dr. Mikey Newton  [address], [address] [facility]  Office: [phone]   Fax: [phone]    Carol Taylor  [facility], [address] [facility]  X) [phone] (l) [phone]    [facility].   Dr. Natalee Crawley (provides counseling as well as cognitive evals)  [address] [place] [address]  R) [phone]  (T) [phone]

## 2023-08-23 NOTE — PROGRESS NOTES
Substance Use Topics    Alcohol use: No    Drug use: Never     68-year-old lady following up worsening tremor. She also had an isolated seizure like event without recurrence and has been maintained on Dilantin. Last visit she had a mixed tremor component. We added Sinemet to help treat the resting portion of her tremor and continued Inderal.  She had terrible GI upset with the Sinemet. She rightly discontinued that. Tremor still with worsened state. She is having some memory difficulty. She is trying to be proactive to ensure there is not something ongoing such as a predementia type process    Carotid Doppler July 28, 2023 unremarkable  Visual evoked potential August 1, 2023 normal    Examination  /83   Pulse 89   Resp 16   SpO2 98%   Very pleasant lady. She is awake alert and oriented. Speech and language normal with the exception of some vocal tremor. Postural tremor of the outstretched hands with mild rest component. No cogwheeling. No bradykinesia. No ataxia    Impression/Plan  Worsening tremor and side effects of Sinemet  Continue Inderal  Add low-dose Topamax 25 mg nightly x2 weeks then 50 mg nightly thereafter    Memory difficulty  Formal neuropsychological eval        Tony Patel MD        This note was created using voice recognition software. Despite editing, there may be syntax errors.

## 2023-12-01 ENCOUNTER — OFFICE VISIT (OUTPATIENT)
Age: 73
End: 2023-12-01
Payer: MEDICARE

## 2023-12-01 VITALS
RESPIRATION RATE: 20 BRPM | SYSTOLIC BLOOD PRESSURE: 138 MMHG | HEART RATE: 78 BPM | DIASTOLIC BLOOD PRESSURE: 80 MMHG | OXYGEN SATURATION: 97 %

## 2023-12-01 DIAGNOSIS — R25.9 MIXED ACTION AND RESTING TREMOR: Primary | ICD-10-CM

## 2023-12-01 PROCEDURE — G8428 CUR MEDS NOT DOCUMENT: HCPCS

## 2023-12-01 PROCEDURE — 1036F TOBACCO NON-USER: CPT

## 2023-12-01 PROCEDURE — G8417 CALC BMI ABV UP PARAM F/U: HCPCS

## 2023-12-01 PROCEDURE — 3017F COLORECTAL CA SCREEN DOC REV: CPT

## 2023-12-01 PROCEDURE — 1090F PRES/ABSN URINE INCON ASSESS: CPT

## 2023-12-01 PROCEDURE — 99214 OFFICE O/P EST MOD 30 MIN: CPT

## 2023-12-01 PROCEDURE — G8484 FLU IMMUNIZE NO ADMIN: HCPCS

## 2023-12-01 PROCEDURE — 1123F ACP DISCUSS/DSCN MKR DOCD: CPT

## 2023-12-01 PROCEDURE — G8400 PT W/DXA NO RESULTS DOC: HCPCS

## 2023-12-01 NOTE — PROGRESS NOTES
Navarro Smart is a 72 y.o. female who presents with the following  Chief Complaint   Patient presents with    Follow-up     Essential tremors        HPI  Patient is here today for mixed tremor follow-up. Patient was last seen August 22, 2023 at which time the patient has had tremor for years but was worsening from only being in the left hand to now also in the right hand. She also has tremor of the voice. Dr. Fletcher Reilly had added Sinemet back in July however, the patient stated that she had terrible GI upset with the Sinemet and had to stop taking it. She was said to have tried primidone in the past that may have caused some confusion. Dr. Fletcher Reilly kept the patient on Inderal 10 mg twice a day and added Topamax at her last visit 25 mg for 2 weeks and then 50 mg going forward. And advised the patient to get neuropsych testing for memory difficulties. Today the patient states that she unfortunately had mood changes with Topamax and had to stop taking it. She said that she could have dropped a bowl of sugar on the floor and went out of cared to clean it up. She continues to take the propanolol 10 mg twice a day. She says that she has been on this dose for a long time because before she received her pacemaker, it made her pulse and pressure too low to raise it. Today the patient says that she wonders if when she tried the Sinemet if she was having GI upset because she was sick with a virus at the same time. She would like to try using it again now. Patient also wonders if she may have Parkinson's. Patient does have significant tremor at rest of bilateral hands, balance issues with a fall a month ago, reduced right arm swing while walking, softening of the voice, stiffness in the morning and shuffling on the way to the restroom when she wakes up, memory issues, and finds that sometimes she drools on the left side of her mouth.   She did have neuropsych testing with Dr. Yoan [facility] at [facility] who felt that the

## 2023-12-01 NOTE — PROGRESS NOTES
Has been on propranolol for years   The topamax didn't work at all, made her not care about things  The other medication didn't work

## 2023-12-07 ENCOUNTER — TELEPHONE (OUTPATIENT)
Age: 73
End: 2023-12-07

## 2023-12-07 NOTE — TELEPHONE ENCOUNTER
Patient is requesting a call to discuss an alternative medication for her before leaving for [place] on 12/11. States the medication (SINEMET) makes her nauseous and vomit.     Please contact to discuss.

## 2024-03-08 ENCOUNTER — OFFICE VISIT (OUTPATIENT)
Age: 74
End: 2024-03-08
Payer: MEDICARE

## 2024-03-08 VITALS — DIASTOLIC BLOOD PRESSURE: 82 MMHG | RESPIRATION RATE: 20 BRPM | SYSTOLIC BLOOD PRESSURE: 126 MMHG

## 2024-03-08 DIAGNOSIS — R25.9 MIXED ACTION AND RESTING TREMOR: Primary | ICD-10-CM

## 2024-03-08 DIAGNOSIS — R29.818 PARKINSONIAN FEATURES: ICD-10-CM

## 2024-03-08 PROCEDURE — 99214 OFFICE O/P EST MOD 30 MIN: CPT

## 2024-03-08 PROCEDURE — G8427 DOCREV CUR MEDS BY ELIG CLIN: HCPCS

## 2024-03-08 PROCEDURE — G8400 PT W/DXA NO RESULTS DOC: HCPCS

## 2024-03-08 PROCEDURE — G8484 FLU IMMUNIZE NO ADMIN: HCPCS

## 2024-03-08 PROCEDURE — 1090F PRES/ABSN URINE INCON ASSESS: CPT

## 2024-03-08 PROCEDURE — 1123F ACP DISCUSS/DSCN MKR DOCD: CPT

## 2024-03-08 PROCEDURE — G8417 CALC BMI ABV UP PARAM F/U: HCPCS

## 2024-03-08 PROCEDURE — 1036F TOBACCO NON-USER: CPT

## 2024-03-08 PROCEDURE — 3017F COLORECTAL CA SCREEN DOC REV: CPT

## 2024-03-08 RX ORDER — LEVODOPA AND CARBIDOPA 95; 23.75 MG/1; MG/1
1 CAPSULE, EXTENDED RELEASE ORAL 3 TIMES DAILY
Qty: 270 CAPSULE | Refills: 3 | Status: SHIPPED | OUTPATIENT
Start: 2024-03-08

## 2024-03-08 ASSESSMENT — ENCOUNTER SYMPTOMS
VOMITING: 1
NAUSEA: 1
CONSTIPATION: 1

## 2024-03-08 NOTE — PROGRESS NOTES
Keila Alcazar is a 73 y.o. female who presents with the following  Chief Complaint   Patient presents with    Follow-up     Tremors, started on medication      Last office visit note  HPI  Patient is here today for mixed tremor follow-up.  Patient was last seen August 22, 2023 at which time the patient has had tremor for years but was worsening from only being in the left hand to now also in the right hand.  She also has tremor of the voice.  Dr. Amaya had added Sinemet back in July however, the patient stated that she had terrible GI upset with the Sinemet and had to stop taking it.  She was said to have tried primidone in the past that may have caused some confusion.   Dr. Amaya kept the patient on Inderal 10 mg twice a day and added Topamax at her last visit 25 mg for 2 weeks and then 50 mg going forward.  And advised the patient to get neuropsych testing for memory difficulties.  Today the patient states that she unfortunately had mood changes with Topamax and had to stop taking it.  She said that she could have dropped a bowl of sugar on the floor and went out of cared to clean it up.  She continues to take the propanolol 10 mg twice a day.  She says that she has been on this dose for a long time because before she received her pacemaker, it made her pulse and pressure too low to raise it.  Today the patient says that she wonders if when she tried the Sinemet if she was having GI upset because she was sick with a virus at the same time.  She would like to try using it again now.  Patient also wonders if she may have Parkinson's.  Patient does have significant tremor at rest of bilateral hands, balance issues with a fall a month ago, reduced right arm swing while walking, softening of the voice, stiffness in the morning and shuffling on the way to the restroom when she wakes up, memory issues, and finds that sometimes she drools on the left side of her mouth.  She did have neuropsych testing with Dr. Garcia at

## 2024-03-08 NOTE — PROGRESS NOTES
Tremors- she thinks they are better with the medication   She can not do 3 pills a day she can not function   If she knows she is going to stay at home she can take 3   In the morning and then late afternoon, she can not get that lunch time dose she can not drive or go anywhere it makes her kind of sleepy   She does better when she spaces it out

## 2024-03-18 ENCOUNTER — TELEPHONE (OUTPATIENT)
Age: 74
End: 2024-03-18

## 2024-03-18 NOTE — TELEPHONE ENCOUNTER
Patient called and stated she just got off the phone with [facility] and they informed her a fax was sent over to  clinic needing more information regarding medication Rytary.    Case # [id number]    [facility] - [phone]    Next f/u 06/07/24

## 2024-03-18 NOTE — TELEPHONE ENCOUNTER
RE:Carbidopa leva dopa 23.75-95 MG CPCR extended release capsule prior authorization request completed via phone with [facility]    Case number [id number]    Approved      Start date is 2/17/2024 medication is approved for the life of the plan as long as the patient remains active on this insurance plan     Approval  letter scanned to media     FYI to nurse

## 2024-06-06 NOTE — PROGRESS NOTES
Keila Alcazar is a 74 y.o. female who presents with the following  Chief Complaint   Patient presents with    Follow-up     Tremors      Last office visit note  HPI  Patient is here today for mixed tremor and parkinsonism follow-up.  Since the patient was here last, she has been taking the Sinemet  mg dose mostly twice a day morning and night due to side effects that prevent her from taking it 3 times a day.  The side effects that the patient is experiencing is nausea, vomiting, drowsiness, and constipation.  Patient had attempted to take Sinemet in July of last year as well when Dr. Amaya prescribed it and also had terrible GI upset and stopped taking it then.  She believes that the 2 pills a day have been helping reduce her tremor.  She still has the resting tremor however, she says the left hand is a lot less tremulous than it used to be.  She still has some trouble eating with that hand.  She still has morning stiffness and shuffling gait.  She says that she does not notice that she drools out of the left side of her mouth any longer.  She continues to take propranolol 10 mg twice a day and is tolerating that well.    We will attempt to start the patient on Rytary 23.75-95 mg 3 times a day because the patient cannot tolerate Sinemet due to side effects.  Stop Sinemet once she receives the Rytary.  We will get a PA on this medication.  Patient asked for it to be sent to her mail order pharmacy [facility].  Advised the patient to let me know if [facility] does not have the medication and we will send it to [facility] and she stated that she would.  Will have the patient follow-up in 3 months to see how she is doing with the new medication.       Today's office visit note  HPI  Patient is here for mixed tremor and parkinsonianism.  Patient was started on Rytary 23.75-95 mg 3 times a day at her last visit here as she could not tolerate regular immediate release Sinemet.  She says that she has been

## 2024-06-07 ENCOUNTER — OFFICE VISIT (OUTPATIENT)
Age: 74
End: 2024-06-07
Payer: MEDICARE

## 2024-06-07 VITALS
DIASTOLIC BLOOD PRESSURE: 68 MMHG | OXYGEN SATURATION: 95 % | SYSTOLIC BLOOD PRESSURE: 112 MMHG | HEART RATE: 78 BPM | RESPIRATION RATE: 20 BRPM

## 2024-06-07 DIAGNOSIS — R25.9 MIXED ACTION AND RESTING TREMOR: ICD-10-CM

## 2024-06-07 DIAGNOSIS — R29.818 PARKINSONIAN FEATURES: Primary | ICD-10-CM

## 2024-06-07 PROCEDURE — 3017F COLORECTAL CA SCREEN DOC REV: CPT

## 2024-06-07 PROCEDURE — G8400 PT W/DXA NO RESULTS DOC: HCPCS

## 2024-06-07 PROCEDURE — 1090F PRES/ABSN URINE INCON ASSESS: CPT

## 2024-06-07 PROCEDURE — 1036F TOBACCO NON-USER: CPT

## 2024-06-07 PROCEDURE — G8427 DOCREV CUR MEDS BY ELIG CLIN: HCPCS

## 2024-06-07 PROCEDURE — G8417 CALC BMI ABV UP PARAM F/U: HCPCS

## 2024-06-07 PROCEDURE — 99214 OFFICE O/P EST MOD 30 MIN: CPT

## 2024-06-07 PROCEDURE — 1123F ACP DISCUSS/DSCN MKR DOCD: CPT

## 2024-06-07 NOTE — PROGRESS NOTES
She thinks it is definitely helping in the right hand, the left hand she does notice it is still about the same  Her balance is still not that great

## 2024-09-20 ENCOUNTER — OFFICE VISIT (OUTPATIENT)
Age: 74
End: 2024-09-20
Payer: MEDICARE

## 2024-09-20 VITALS
SYSTOLIC BLOOD PRESSURE: 137 MMHG | HEIGHT: 63 IN | RESPIRATION RATE: 16 BRPM | OXYGEN SATURATION: 96 % | BODY MASS INDEX: 31.11 KG/M2 | TEMPERATURE: 98.4 F | WEIGHT: 175.6 LBS | DIASTOLIC BLOOD PRESSURE: 67 MMHG | HEART RATE: 68 BPM

## 2024-09-20 DIAGNOSIS — R29.818 PARKINSONIAN FEATURES: ICD-10-CM

## 2024-09-20 DIAGNOSIS — R25.9 MIXED ACTION AND RESTING TREMOR: Primary | ICD-10-CM

## 2024-09-20 PROCEDURE — 3017F COLORECTAL CA SCREEN DOC REV: CPT

## 2024-09-20 PROCEDURE — G8427 DOCREV CUR MEDS BY ELIG CLIN: HCPCS

## 2024-09-20 PROCEDURE — 99214 OFFICE O/P EST MOD 30 MIN: CPT

## 2024-09-20 PROCEDURE — 1123F ACP DISCUSS/DSCN MKR DOCD: CPT

## 2024-09-20 PROCEDURE — 1090F PRES/ABSN URINE INCON ASSESS: CPT

## 2024-09-20 PROCEDURE — G8400 PT W/DXA NO RESULTS DOC: HCPCS

## 2024-09-20 PROCEDURE — 1036F TOBACCO NON-USER: CPT

## 2024-09-20 PROCEDURE — G8417 CALC BMI ABV UP PARAM F/U: HCPCS

## 2024-10-07 ENCOUNTER — HOSPITAL ENCOUNTER (INPATIENT)
Facility: HOSPITAL | Age: 74
LOS: 6 days | Discharge: HOME | DRG: 328 | End: 2024-10-13
Attending: EMERGENCY MEDICINE | Admitting: HOSPITALIST
Payer: MEDICARE

## 2024-10-07 ENCOUNTER — APPOINTMENT (OUTPATIENT)
Facility: HOSPITAL | Age: 74
DRG: 328 | End: 2024-10-07
Payer: MEDICARE

## 2024-10-07 DIAGNOSIS — R10.9 ABDOMINAL PAIN, UNSPECIFIED ABDOMINAL LOCATION: ICD-10-CM

## 2024-10-07 DIAGNOSIS — K44.9 HIATAL HERNIA: Primary | ICD-10-CM

## 2024-10-07 LAB
ALBUMIN SERPL-MCNC: 3.9 G/DL (ref 3.5–5)
ALBUMIN/GLOB SERPL: 1.1 (ref 1.1–2.2)
ALP SERPL-CCNC: 65 U/L (ref 45–117)
ALT SERPL-CCNC: 15 U/L (ref 12–78)
ANION GAP SERPL CALC-SCNC: 1 MMOL/L (ref 2–12)
AST SERPL-CCNC: 23 U/L (ref 15–37)
BASOPHILS # BLD: 0 K/UL (ref 0–0.1)
BASOPHILS NFR BLD: 1 % (ref 0–1)
BILIRUB SERPL-MCNC: 0.5 MG/DL (ref 0.2–1)
BUN SERPL-MCNC: 6 MG/DL (ref 6–20)
BUN/CREAT SERPL: 7 (ref 12–20)
CALCIUM SERPL-MCNC: 8.7 MG/DL (ref 8.5–10.1)
CHLORIDE SERPL-SCNC: 107 MMOL/L (ref 97–108)
CO2 SERPL-SCNC: 27 MMOL/L (ref 21–32)
COMMENT:: NORMAL
CREAT SERPL-MCNC: 0.82 MG/DL (ref 0.55–1.02)
DIFFERENTIAL METHOD BLD: NORMAL
EOSINOPHIL # BLD: 0.1 K/UL (ref 0–0.4)
EOSINOPHIL NFR BLD: 1 % (ref 0–7)
ERYTHROCYTE [DISTWIDTH] IN BLOOD BY AUTOMATED COUNT: 13.2 % (ref 11.5–14.5)
GLOBULIN SER CALC-MCNC: 3.6 G/DL (ref 2–4)
GLUCOSE SERPL-MCNC: 115 MG/DL (ref 65–100)
HCT VFR BLD AUTO: 41.2 % (ref 35–47)
HGB BLD-MCNC: 13.6 G/DL (ref 11.5–16)
IMM GRANULOCYTES # BLD AUTO: 0 K/UL (ref 0–0.04)
IMM GRANULOCYTES NFR BLD AUTO: 0 % (ref 0–0.5)
LIPASE SERPL-CCNC: 23 U/L (ref 13–75)
LYMPHOCYTES # BLD: 1.5 K/UL (ref 0.8–3.5)
LYMPHOCYTES NFR BLD: 22 % (ref 12–49)
MCH RBC QN AUTO: 32.3 PG (ref 26–34)
MCHC RBC AUTO-ENTMCNC: 33 G/DL (ref 30–36.5)
MCV RBC AUTO: 97.9 FL (ref 80–99)
MONOCYTES # BLD: 0.6 K/UL (ref 0–1)
MONOCYTES NFR BLD: 9 % (ref 5–13)
NEUTS SEG # BLD: 4.4 K/UL (ref 1.8–8)
NEUTS SEG NFR BLD: 67 % (ref 32–75)
NRBC # BLD: 0 K/UL (ref 0–0.01)
NRBC BLD-RTO: 0 PER 100 WBC
PLATELET # BLD AUTO: 364 K/UL (ref 150–400)
PMV BLD AUTO: 9 FL (ref 8.9–12.9)
POTASSIUM SERPL-SCNC: 4.6 MMOL/L (ref 3.5–5.1)
PROT SERPL-MCNC: 7.5 G/DL (ref 6.4–8.2)
RBC # BLD AUTO: 4.21 M/UL (ref 3.8–5.2)
SODIUM SERPL-SCNC: 135 MMOL/L (ref 136–145)
SPECIMEN HOLD: NORMAL
WBC # BLD AUTO: 6.6 K/UL (ref 3.6–11)

## 2024-10-07 PROCEDURE — 80053 COMPREHEN METABOLIC PANEL: CPT

## 2024-10-07 PROCEDURE — 1100000000 HC RM PRIVATE

## 2024-10-07 PROCEDURE — 74177 CT ABD & PELVIS W/CONTRAST: CPT

## 2024-10-07 PROCEDURE — 6360000002 HC RX W HCPCS: Performed by: HOSPITALIST

## 2024-10-07 PROCEDURE — 6360000004 HC RX CONTRAST MEDICATION: Performed by: NURSE PRACTITIONER

## 2024-10-07 PROCEDURE — 96375 TX/PRO/DX INJ NEW DRUG ADDON: CPT

## 2024-10-07 PROCEDURE — 83690 ASSAY OF LIPASE: CPT

## 2024-10-07 PROCEDURE — 85025 COMPLETE CBC W/AUTO DIFF WBC: CPT

## 2024-10-07 PROCEDURE — 6360000002 HC RX W HCPCS: Performed by: NURSE PRACTITIONER

## 2024-10-07 PROCEDURE — 2580000003 HC RX 258

## 2024-10-07 PROCEDURE — 96374 THER/PROPH/DIAG INJ IV PUSH: CPT

## 2024-10-07 PROCEDURE — 36415 COLL VENOUS BLD VENIPUNCTURE: CPT

## 2024-10-07 PROCEDURE — 6360000002 HC RX W HCPCS

## 2024-10-07 PROCEDURE — 94761 N-INVAS EAR/PLS OXIMETRY MLT: CPT

## 2024-10-07 PROCEDURE — 2580000003 HC RX 258: Performed by: HOSPITALIST

## 2024-10-07 PROCEDURE — 99285 EMERGENCY DEPT VISIT HI MDM: CPT

## 2024-10-07 RX ORDER — PHENYTOIN SODIUM 50 MG/ML
100 INJECTION INTRAMUSCULAR; INTRAVENOUS EVERY 8 HOURS
Status: DISCONTINUED | OUTPATIENT
Start: 2024-10-07 | End: 2024-10-11

## 2024-10-07 RX ORDER — ONDANSETRON 2 MG/ML
4 INJECTION INTRAMUSCULAR; INTRAVENOUS EVERY 6 HOURS PRN
Status: DISCONTINUED | OUTPATIENT
Start: 2024-10-07 | End: 2024-10-13 | Stop reason: HOSPADM

## 2024-10-07 RX ORDER — KETOROLAC TROMETHAMINE 15 MG/ML
15 INJECTION, SOLUTION INTRAMUSCULAR; INTRAVENOUS ONCE
Status: COMPLETED | OUTPATIENT
Start: 2024-10-07 | End: 2024-10-07

## 2024-10-07 RX ORDER — SODIUM CHLORIDE 9 MG/ML
INJECTION, SOLUTION INTRAVENOUS PRN
Status: DISCONTINUED | OUTPATIENT
Start: 2024-10-07 | End: 2024-10-13 | Stop reason: HOSPADM

## 2024-10-07 RX ORDER — MAGNESIUM SULFATE IN WATER 40 MG/ML
2000 INJECTION, SOLUTION INTRAVENOUS PRN
Status: DISCONTINUED | OUTPATIENT
Start: 2024-10-07 | End: 2024-10-13 | Stop reason: HOSPADM

## 2024-10-07 RX ORDER — SODIUM CHLORIDE 9 MG/ML
INJECTION, SOLUTION INTRAVENOUS CONTINUOUS
Status: DISCONTINUED | OUTPATIENT
Start: 2024-10-07 | End: 2024-10-12

## 2024-10-07 RX ORDER — SODIUM CHLORIDE 0.9 % (FLUSH) 0.9 %
5-40 SYRINGE (ML) INJECTION EVERY 12 HOURS SCHEDULED
Status: DISCONTINUED | OUTPATIENT
Start: 2024-10-07 | End: 2024-10-13 | Stop reason: HOSPADM

## 2024-10-07 RX ORDER — POTASSIUM CHLORIDE 7.45 MG/ML
10 INJECTION INTRAVENOUS PRN
Status: DISCONTINUED | OUTPATIENT
Start: 2024-10-07 | End: 2024-10-13 | Stop reason: HOSPADM

## 2024-10-07 RX ORDER — ONDANSETRON 4 MG/1
4 TABLET, ORALLY DISINTEGRATING ORAL EVERY 8 HOURS PRN
Status: DISCONTINUED | OUTPATIENT
Start: 2024-10-07 | End: 2024-10-13 | Stop reason: HOSPADM

## 2024-10-07 RX ORDER — POLYETHYLENE GLYCOL 3350 17 G/17G
17 POWDER, FOR SOLUTION ORAL DAILY PRN
Status: DISCONTINUED | OUTPATIENT
Start: 2024-10-07 | End: 2024-10-13 | Stop reason: HOSPADM

## 2024-10-07 RX ORDER — FENTANYL CITRATE 50 UG/ML
25 INJECTION, SOLUTION INTRAMUSCULAR; INTRAVENOUS EVERY 4 HOURS PRN
Status: DISCONTINUED | OUTPATIENT
Start: 2024-10-07 | End: 2024-10-09

## 2024-10-07 RX ORDER — IOPAMIDOL 755 MG/ML
100 INJECTION, SOLUTION INTRAVASCULAR
Status: COMPLETED | OUTPATIENT
Start: 2024-10-07 | End: 2024-10-07

## 2024-10-07 RX ORDER — FENTANYL CITRATE 50 UG/ML
25 INJECTION, SOLUTION INTRAMUSCULAR; INTRAVENOUS
Status: COMPLETED | OUTPATIENT
Start: 2024-10-07 | End: 2024-10-07

## 2024-10-07 RX ORDER — POTASSIUM CHLORIDE 750 MG/1
40 TABLET, EXTENDED RELEASE ORAL PRN
Status: DISCONTINUED | OUTPATIENT
Start: 2024-10-07 | End: 2024-10-13 | Stop reason: HOSPADM

## 2024-10-07 RX ORDER — ACETAMINOPHEN 650 MG/1
650 SUPPOSITORY RECTAL EVERY 6 HOURS PRN
Status: DISCONTINUED | OUTPATIENT
Start: 2024-10-07 | End: 2024-10-13 | Stop reason: HOSPADM

## 2024-10-07 RX ORDER — SODIUM CHLORIDE 0.9 % (FLUSH) 0.9 %
5-40 SYRINGE (ML) INJECTION PRN
Status: DISCONTINUED | OUTPATIENT
Start: 2024-10-07 | End: 2024-10-13 | Stop reason: HOSPADM

## 2024-10-07 RX ORDER — ACETAMINOPHEN 325 MG/1
650 TABLET ORAL EVERY 6 HOURS PRN
Status: DISCONTINUED | OUTPATIENT
Start: 2024-10-07 | End: 2024-10-13 | Stop reason: HOSPADM

## 2024-10-07 RX ADMIN — HYDROMORPHONE HYDROCHLORIDE 0.25 MG: 1 INJECTION, SOLUTION INTRAMUSCULAR; INTRAVENOUS; SUBCUTANEOUS at 21:33

## 2024-10-07 RX ADMIN — IOPAMIDOL 100 ML: 755 INJECTION, SOLUTION INTRAVENOUS at 09:58

## 2024-10-07 RX ADMIN — SODIUM CHLORIDE, PRESERVATIVE FREE 10 ML: 5 INJECTION INTRAVENOUS at 21:35

## 2024-10-07 RX ADMIN — PANTOPRAZOLE SODIUM 40 MG: 40 INJECTION, POWDER, FOR SOLUTION INTRAVENOUS at 13:47

## 2024-10-07 RX ADMIN — ONDANSETRON 4 MG: 2 INJECTION INTRAMUSCULAR; INTRAVENOUS at 21:38

## 2024-10-07 RX ADMIN — SODIUM CHLORIDE: 9 INJECTION, SOLUTION INTRAVENOUS at 17:53

## 2024-10-07 RX ADMIN — PHENYTOIN SODIUM 100 MG: 50 INJECTION INTRAMUSCULAR; INTRAVENOUS at 17:41

## 2024-10-07 RX ADMIN — FENTANYL CITRATE 25 MCG: 50 INJECTION INTRAMUSCULAR; INTRAVENOUS at 19:41

## 2024-10-07 RX ADMIN — FENTANYL CITRATE 25 MCG: 50 INJECTION INTRAMUSCULAR; INTRAVENOUS at 23:48

## 2024-10-07 RX ADMIN — KETOROLAC TROMETHAMINE 15 MG: 15 INJECTION, SOLUTION INTRAMUSCULAR; INTRAVENOUS at 12:10

## 2024-10-07 RX ADMIN — FENTANYL CITRATE 25 MCG: 50 INJECTION INTRAMUSCULAR; INTRAVENOUS at 13:52

## 2024-10-07 ASSESSMENT — PAIN SCALES - GENERAL
PAINLEVEL_OUTOF10: 5
PAINLEVEL_OUTOF10: 6
PAINLEVEL_OUTOF10: 6
PAINLEVEL_OUTOF10: 9
PAINLEVEL_OUTOF10: 7
PAINLEVEL_OUTOF10: 9
PAINLEVEL_OUTOF10: 10

## 2024-10-07 ASSESSMENT — PAIN DESCRIPTION - DESCRIPTORS
DESCRIPTORS: ACHING
DESCRIPTORS: ACHING
DESCRIPTORS: STABBING

## 2024-10-07 ASSESSMENT — PAIN DESCRIPTION - LOCATION
LOCATION: ABDOMEN

## 2024-10-07 ASSESSMENT — PAIN - FUNCTIONAL ASSESSMENT: PAIN_FUNCTIONAL_ASSESSMENT: 0-10

## 2024-10-07 ASSESSMENT — PAIN DESCRIPTION - ORIENTATION: ORIENTATION: MID

## 2024-10-07 NOTE — CONSULTS
Flora Sanchez PA-C                       [phone] office             Monday-Friday 8:00 am-4:30 pm  I am not permitted to use \"perfect serve\" use above for contact, thanks.      Gastroenterology Consultation Note      Admit Date: 10/7/2024  Consult Date: 10/7/2024   I greatly appreciate your asking me to see Keila Alcazar, thank you very much for the opportunity to participate in her care.    Narrative Assessment and Plan   74-year-old female with known hiatal hernia and GERD presenting to ER with severe epigastric/lower chest pressure and cramping pain, worse after eating, now preventing her from eating anything, and accompanied with nausea and nonbloody vomiting.  CT abdomen pelvis with no acute abdominal or pelvic pathology, does show large hiatal hernia.    Impression:  Large hiatal hernia  Abdominal pain  Nausea and vomiting    Plan:  I have started pantoprazole 40 mg daily, please keep the patient n.p.o. for the time being aside from ice chips.  Given significant epigastric/lower chest pain and known hiatal hernia, I have consulted general surgery for further evaluation for repair.  Given how long ago her prior EGD was (2012), the patient would benefit from further evaluation with repeat EGD, possibly tomorrow or Wednesday depending on endoscopy and physician availability.  Plan discussed with Dr. Ivory.    Flora Sanchez PA-C    Seen and interviewed independent from Ms. Sanchez.  Discussed with patient and Ms Sanchez, above is my plan.    If this is pain from erosive / acid reflux, it seems non responsive to PPI and carafate.  It is possible this pain is related to the hernia in absence of significant ulceration or erosion.  In order to guide treatment plan an endoscopic evaluation seems wise and the patient has asked we proceed.  Romeo Ivory MD     Subjective:     Chief Complaint: abdominal pain     History of

## 2024-10-07 NOTE — H&P
Hospitalist Admission Note      NAME:  Keila Alcazar   :  1950   MRN:  [id number]     Date/Time:  10/7/2024 2:19 PM    Patient PCP: Anca Velasquez MD    ________________________________________________________________________    Given the patient's current clinical presentation, I have a high level of concern for decompensation if discharged from the emergency department.  Complex decision making was performed, which includes reviewing the patient's available past medical records, laboratory results, and x-ray films.       My assessment of this patient's clinical condition and my plan of care is as follows.    Assessment / Plan:  Patient is a 74-year-old female with history of hypothyroidism, seizure disorder,pulmonary embolism, GERD, essential tremors comes to the hospital with abdominal pain and was found to have a large hiatal hernia.  Patient is admitted for IV Protonix, pain control, GI and surgery consult.    1.  Abdominal pain  Due to large hiatal hernia.  Keep patient n.p.o., start IV Protonix.  GI and general surgery consult.  Pain control  Patient has a history of GERD.    2.  Hypothyroidism  Patient is taking Synthroid 150 mcg daily.  Check TSH level.    3.  Seizures  Patient is taking Dilantin at home  Patient had seizure activity when she had pulmonary embolism.  [facility].  Switch dilantin to iv q8h.    4.  History of pulmonary embolism  Patient is taking Xarelto.  Patient is taking Xarelto for last 23 years due to prior pulmonary embolism.  Holding Xarelto at this time.  Patient has history of pulmonary embolism twice.    5.  GERD  Patient takes PPI at home.    6.  Essential tremors  Patient is taking propranolol at home.           I have personally reviewed the radiographs, laboratory data in Epic and decisions and statements above are based partially on this personal interpretation.    Code Status: Full Code  DVT Prophylaxis: SCD's  GI Prophylaxis:

## 2024-10-07 NOTE — ED PROVIDER NOTES
[facility] EMERGENCY DEPT  EMERGENCY DEPARTMENT ENCOUNTER      Pt Name: Keila Alcazar  MRN: [id number]  Birthdate 1950  Date of evaluation: 10/7/2024  Provider: RENETTA Root NP      HISTORY OF PRESENT ILLNESS      The patient presented to the clinic with a chief complaint of diffuse abdominal pain that has progressively worsened over the past month. She described the pain as throbbing and aching, stating, \"I am miserable,\" and reported that the discomfort feels like her stomach is \"blown up.\"    Currently, she rates the pain as a 6 out of 10, noting that it intensifies at night. Associated symptoms include significant nausea, having vomited four times since last night, though there was no blood in the emesis. Her last bowel movement was this morning, yielding soft, dark brown stool. The patient has been unable to eat more than three saltine crackers since Sunday and reported that even broth consumed last night exacerbated her symptoms.  The patient's history reveals that she has been on Xarelto for the past 23 years due to a prior pulmonary embolism. Notably, she denies any chest pain, shortness of breath, or dizziness. She also reports no fever or chills.    Past Medical History:  No date: Chronic pain      Comment:  rsd in left foot  No date: GERD (gastroesophageal reflux disease)  hx of several pe,s: Liver disease  No date: Other ill-defined conditions(799.89)      Comment:  essential tremors  No date: Seasonal allergic rhinitis  No date: Seizures (HCC)      Comment:  2002 1990: Stroke (HCC)  No date: Thromboembolus (HCC)      Comment:  pulmary embolism  No date: Thyroid disease      Comment:  hypothriodism  Past Surgical History:  No date: APPENDECTOMY  No date: CHOLECYSTECTOMY, LAPAROSCOPIC  4/22/2022: COLONOSCOPY; N/A      Comment:  COLONOSCOPY performed by Romeo Ivory MD at [facility]               ENDOSCOPY  No date: GYN      Comment:  cyst on ovaries  No date: HEENT      Comment:

## 2024-10-07 NOTE — ED TRIAGE NOTES
Pt arrives to the ER for complaints of mid epigastric pain and generalized abdominal pain that started about a month ago.     Pt is followed by GI and has had an abdominal US and CT scan. Pt reports that she is scheduled for an endoscopy on 10/25 with Dr. Ivory.     Reports that she also has had vomiting but denies any hematemesis.     Reports last BM was this morning.     Pt reports that she takes blood thinners.

## 2024-10-08 ENCOUNTER — ANESTHESIA (OUTPATIENT)
Facility: HOSPITAL | Age: 74
End: 2024-10-08
Payer: MEDICARE

## 2024-10-08 ENCOUNTER — ANESTHESIA EVENT (OUTPATIENT)
Facility: HOSPITAL | Age: 74
End: 2024-10-08
Payer: MEDICARE

## 2024-10-08 LAB
ALBUMIN SERPL-MCNC: 3.5 G/DL (ref 3.5–5)
ALBUMIN/GLOB SERPL: 1.2 (ref 1.1–2.2)
ALP SERPL-CCNC: 58 U/L (ref 45–117)
ALT SERPL-CCNC: 14 U/L (ref 12–78)
ANION GAP SERPL CALC-SCNC: 1 MMOL/L (ref 2–12)
AST SERPL-CCNC: 10 U/L (ref 15–37)
BASOPHILS # BLD: 0 K/UL (ref 0–0.1)
BASOPHILS NFR BLD: 0 % (ref 0–1)
BILIRUB SERPL-MCNC: 0.9 MG/DL (ref 0.2–1)
BUN SERPL-MCNC: 8 MG/DL (ref 6–20)
BUN/CREAT SERPL: 11 (ref 12–20)
CALCIUM SERPL-MCNC: 8.1 MG/DL (ref 8.5–10.1)
CHLORIDE SERPL-SCNC: 109 MMOL/L (ref 97–108)
CO2 SERPL-SCNC: 27 MMOL/L (ref 21–32)
CREAT SERPL-MCNC: 0.75 MG/DL (ref 0.55–1.02)
DIFFERENTIAL METHOD BLD: ABNORMAL
EOSINOPHIL # BLD: 0 K/UL (ref 0–0.4)
EOSINOPHIL NFR BLD: 1 % (ref 0–7)
ERYTHROCYTE [DISTWIDTH] IN BLOOD BY AUTOMATED COUNT: 13.5 % (ref 11.5–14.5)
GLOBULIN SER CALC-MCNC: 3 G/DL (ref 2–4)
GLUCOSE SERPL-MCNC: 125 MG/DL (ref 65–100)
HCT VFR BLD AUTO: 37.7 % (ref 35–47)
HGB BLD-MCNC: 12.4 G/DL (ref 11.5–16)
IMM GRANULOCYTES # BLD AUTO: 0 K/UL (ref 0–0.04)
IMM GRANULOCYTES NFR BLD AUTO: 0 % (ref 0–0.5)
LYMPHOCYTES # BLD: 1.2 K/UL (ref 0.8–3.5)
LYMPHOCYTES NFR BLD: 17 % (ref 12–49)
MCH RBC QN AUTO: 32.2 PG (ref 26–34)
MCHC RBC AUTO-ENTMCNC: 32.9 G/DL (ref 30–36.5)
MCV RBC AUTO: 97.9 FL (ref 80–99)
MONOCYTES # BLD: 0.6 K/UL (ref 0–1)
MONOCYTES NFR BLD: 9 % (ref 5–13)
NEUTS SEG # BLD: 5.2 K/UL (ref 1.8–8)
NEUTS SEG NFR BLD: 73 % (ref 32–75)
NRBC # BLD: 0 K/UL (ref 0–0.01)
NRBC BLD-RTO: 0 PER 100 WBC
PHENYTOIN SERPL-MCNC: 9.4 UG/ML (ref 10–20)
PLATELET # BLD AUTO: 299 K/UL (ref 150–400)
PMV BLD AUTO: 8.2 FL (ref 8.9–12.9)
POTASSIUM SERPL-SCNC: 4.3 MMOL/L (ref 3.5–5.1)
PROT SERPL-MCNC: 6.5 G/DL (ref 6.4–8.2)
RBC # BLD AUTO: 3.85 M/UL (ref 3.8–5.2)
SODIUM SERPL-SCNC: 137 MMOL/L (ref 136–145)
WBC # BLD AUTO: 7.1 K/UL (ref 3.6–11)

## 2024-10-08 PROCEDURE — 2580000003 HC RX 258: Performed by: HOSPITALIST

## 2024-10-08 PROCEDURE — 6370000000 HC RX 637 (ALT 250 FOR IP): Performed by: STUDENT IN AN ORGANIZED HEALTH CARE EDUCATION/TRAINING PROGRAM

## 2024-10-08 PROCEDURE — 2500000003 HC RX 250 WO HCPCS

## 2024-10-08 PROCEDURE — 2720000010 HC SURG SUPPLY STERILE: Performed by: SPECIALIST

## 2024-10-08 PROCEDURE — 36415 COLL VENOUS BLD VENIPUNCTURE: CPT

## 2024-10-08 PROCEDURE — 3600007502: Performed by: SPECIALIST

## 2024-10-08 PROCEDURE — 3700000000 HC ANESTHESIA ATTENDED CARE: Performed by: SPECIALIST

## 2024-10-08 PROCEDURE — 94761 N-INVAS EAR/PLS OXIMETRY MLT: CPT

## 2024-10-08 PROCEDURE — 6360000002 HC RX W HCPCS: Performed by: STUDENT IN AN ORGANIZED HEALTH CARE EDUCATION/TRAINING PROGRAM

## 2024-10-08 PROCEDURE — 80185 ASSAY OF PHENYTOIN TOTAL: CPT

## 2024-10-08 PROCEDURE — 80053 COMPREHEN METABOLIC PANEL: CPT

## 2024-10-08 PROCEDURE — 88305 TISSUE EXAM BY PATHOLOGIST: CPT

## 2024-10-08 PROCEDURE — 2709999900 HC NON-CHARGEABLE SUPPLY: Performed by: SPECIALIST

## 2024-10-08 PROCEDURE — 6360000002 HC RX W HCPCS

## 2024-10-08 PROCEDURE — 1100000000 HC RM PRIVATE

## 2024-10-08 PROCEDURE — 0D758ZZ DILATION OF ESOPHAGUS, VIA NATURAL OR ARTIFICIAL OPENING ENDOSCOPIC: ICD-10-PCS | Performed by: SPECIALIST

## 2024-10-08 PROCEDURE — 7100000010 HC PHASE II RECOVERY - FIRST 15 MIN: Performed by: SPECIALIST

## 2024-10-08 PROCEDURE — 7100000011 HC PHASE II RECOVERY - ADDTL 15 MIN: Performed by: SPECIALIST

## 2024-10-08 PROCEDURE — 85025 COMPLETE CBC W/AUTO DIFF WBC: CPT

## 2024-10-08 PROCEDURE — 0DB58ZX EXCISION OF ESOPHAGUS, VIA NATURAL OR ARTIFICIAL OPENING ENDOSCOPIC, DIAGNOSTIC: ICD-10-PCS | Performed by: SPECIALIST

## 2024-10-08 PROCEDURE — 6360000002 HC RX W HCPCS: Performed by: HOSPITALIST

## 2024-10-08 RX ORDER — SODIUM CHLORIDE 0.9 % (FLUSH) 0.9 %
5-40 SYRINGE (ML) INJECTION PRN
Status: DISCONTINUED | OUTPATIENT
Start: 2024-10-08 | End: 2024-10-08 | Stop reason: HOSPADM

## 2024-10-08 RX ORDER — PROCHLORPERAZINE EDISYLATE 5 MG/ML
10 INJECTION INTRAMUSCULAR; INTRAVENOUS EVERY 6 HOURS PRN
Status: DISCONTINUED | OUTPATIENT
Start: 2024-10-08 | End: 2024-10-13 | Stop reason: HOSPADM

## 2024-10-08 RX ORDER — PROPRANOLOL HCL 10 MG
10 TABLET ORAL 2 TIMES DAILY
Status: DISCONTINUED | OUTPATIENT
Start: 2024-10-08 | End: 2024-10-13 | Stop reason: HOSPADM

## 2024-10-08 RX ORDER — PROPOFOL 10 MG/ML
INJECTION, EMULSION INTRAVENOUS
Status: DISCONTINUED | OUTPATIENT
Start: 2024-10-08 | End: 2024-10-08 | Stop reason: SDUPTHER

## 2024-10-08 RX ORDER — SODIUM CHLORIDE 9 MG/ML
INJECTION, SOLUTION INTRAVENOUS CONTINUOUS
Status: DISCONTINUED | OUTPATIENT
Start: 2024-10-08 | End: 2024-10-08 | Stop reason: HOSPADM

## 2024-10-08 RX ORDER — LEVOTHYROXINE SODIUM 75 UG/1
150 TABLET ORAL
Status: DISCONTINUED | OUTPATIENT
Start: 2024-10-09 | End: 2024-10-08

## 2024-10-08 RX ADMIN — PROPOFOL 30 MG: 10 INJECTION, EMULSION INTRAVENOUS at 11:37

## 2024-10-08 RX ADMIN — PROPOFOL 50 MG: 10 INJECTION, EMULSION INTRAVENOUS at 11:32

## 2024-10-08 RX ADMIN — ONDANSETRON 4 MG: 2 INJECTION INTRAMUSCULAR; INTRAVENOUS at 12:24

## 2024-10-08 RX ADMIN — PHENYTOIN SODIUM 100 MG: 50 INJECTION INTRAMUSCULAR; INTRAVENOUS at 00:59

## 2024-10-08 RX ADMIN — PROPOFOL 50 MG: 10 INJECTION, EMULSION INTRAVENOUS at 11:31

## 2024-10-08 RX ADMIN — PROPRANOLOL HYDROCHLORIDE 10 MG: 10 TABLET ORAL at 14:24

## 2024-10-08 RX ADMIN — PHENYTOIN SODIUM 100 MG: 50 INJECTION INTRAMUSCULAR; INTRAVENOUS at 09:15

## 2024-10-08 RX ADMIN — FENTANYL CITRATE 25 MCG: 50 INJECTION INTRAMUSCULAR; INTRAVENOUS at 09:11

## 2024-10-08 RX ADMIN — PROCHLORPERAZINE EDISYLATE 10 MG: 5 INJECTION INTRAMUSCULAR; INTRAVENOUS at 17:06

## 2024-10-08 RX ADMIN — LIDOCAINE HYDROCHLORIDE 100 MG: 20 INJECTION, SOLUTION INFILTRATION; PERINEURAL at 11:31

## 2024-10-08 RX ADMIN — SODIUM CHLORIDE: 9 INJECTION, SOLUTION INTRAVENOUS at 19:56

## 2024-10-08 RX ADMIN — FENTANYL CITRATE 25 MCG: 50 INJECTION INTRAMUSCULAR; INTRAVENOUS at 04:41

## 2024-10-08 RX ADMIN — PHENYTOIN SODIUM 100 MG: 50 INJECTION INTRAMUSCULAR; INTRAVENOUS at 17:06

## 2024-10-08 RX ADMIN — PROPOFOL 50 MG: 10 INJECTION, EMULSION INTRAVENOUS at 11:35

## 2024-10-08 RX ADMIN — PROPRANOLOL HYDROCHLORIDE 10 MG: 10 TABLET ORAL at 19:58

## 2024-10-08 RX ADMIN — FENTANYL CITRATE 25 MCG: 50 INJECTION INTRAMUSCULAR; INTRAVENOUS at 22:32

## 2024-10-08 RX ADMIN — SODIUM CHLORIDE, PRESERVATIVE FREE 40 MG: 5 INJECTION INTRAVENOUS at 00:29

## 2024-10-08 RX ADMIN — SODIUM CHLORIDE, PRESERVATIVE FREE 40 MG: 5 INJECTION INTRAVENOUS at 12:25

## 2024-10-08 RX ADMIN — SODIUM CHLORIDE: 9 INJECTION, SOLUTION INTRAVENOUS at 03:37

## 2024-10-08 ASSESSMENT — PAIN DESCRIPTION - LOCATION
LOCATION: CHEST
LOCATION: ABDOMEN

## 2024-10-08 ASSESSMENT — PAIN SCALES - GENERAL
PAINLEVEL_OUTOF10: 7
PAINLEVEL_OUTOF10: 8
PAINLEVEL_OUTOF10: 0
PAINLEVEL_OUTOF10: 3
PAINLEVEL_OUTOF10: 7

## 2024-10-08 ASSESSMENT — PAIN DESCRIPTION - ORIENTATION
ORIENTATION: UPPER
ORIENTATION: ANTERIOR

## 2024-10-08 ASSESSMENT — PAIN DESCRIPTION - DESCRIPTORS
DESCRIPTORS: ACHING;PRESSURE
DESCRIPTORS: ACHING

## 2024-10-08 ASSESSMENT — PAIN - FUNCTIONAL ASSESSMENT: PAIN_FUNCTIONAL_ASSESSMENT: 0-10

## 2024-10-08 NOTE — CONSULTS
[facility]          [address] [place]  [address]                              CONSULTATION      PATIENT NAME: VICENTE STEWART              : 1950  MED REC NO: [id number]                       ROOM: 520  ACCOUNT NO: [id number]                       ADMIT DATE: 10/07/2024  PROVIDER: Jacob Lees MD    DATE OF SERVICE:  10/07/2024    ATTENDING PHYSICIAN:  REBEKAH GUZMÁN    REFERRING GASTROLOGY ON RECORD:  Flora Sanchez PA-C    HISTORY OF PRESENT ILLNESS:  The patient is a 74-year-old female with a radiologic findings of large hiatal hernia that has been followed for several years by Dr. Romeo Ivory.  The patient is undergoing endoscopy tomorrow to ensure no evidence of ischemia or worsening of clinical symptoms of the hiatal hernia itself.  She does not have any organ actual notation of the hiatal hernia itself on radiology.  Given the symptoms and the ongoing pain, the gastrologist services requested surgical evaluation and possible repair.    REVIEW OF SYSTEMS:  Positive for above complaints.  A 12-point review of systems is negative other than noted in the HPI.    PAST MEDICAL HISTORY:    1. GERD, on Protonix.  2. Prior pulmonary embolism.  3. Stroke on Xarelto.  4. Essential tremors.  5. Seizures.  6. Hypothyroidism.    PAST SURGICAL HISTORY:    1. Appendectomy.  2. Cholecystectomy.  3. Ovarian cystectomy.  4. Tonsillectomy.  5. Hiatal hernia repair in , pacemaker in .    FAMILY HISTORY:  Heart disease, cancer.    SOCIAL HISTORY:  She is a never smoker.  Does not drink alcohol.    HOME MEDICATIONS:    1. Calcium.  2. Carbidopa levodopa.  3. Rabeprazole.  4. Fexofenadine.  5. L-thyroxine.  6. Montelukast.  7. Omeprazole.  8. Phenytoin.  9. Propranolol.  10. Xarelto.    ALLERGIES:  DIAZEPAM, PENICILLIN, SULFA, MORPHINE.      PHYSICAL EXAMINATION:  VITAL SIGNS: Reviewed.  CONSTITUTIONAL: Age appropriate female, in no apparent

## 2024-10-08 NOTE — ANESTHESIA PRE PROCEDURE
Department of Anesthesiology  Preprocedure Note       Name:  Keila Alcazar   Age:  74 y.o.  :  1950                                          MRN:  [id number]         Date:  10/8/2024      Surgeon: Surgeon(s):  Romeo Ivory MD    Procedure: Procedure(s):  ESOPHAGOGASTRODUODENOSCOPY    Medications prior to admission:   Prior to Admission medications    Medication Sig Start Date End Date Taking? Authorizing Provider   Carbidopa-Levodopa ER 36. MG CPCR Take 1 tablet by mouth in the morning, at noon, and at bedtime  Patient taking differently: Take 1 tablet by mouth daily 24   Gina Cline APRN - NP   Calcium Carbonate-Vitamin D (OYSTER SHELL CALCIUM/D) 500-5 MG-MCG TABS Take 1 tablet by mouth daily    Automatic Reconciliation, Ar   fexofenadine (ALLEGRA) 180 MG tablet Take 1 tablet by mouth daily    Automatic Reconciliation, Ar   levothyroxine (SYNTHROID) 150 MCG tablet Take 1 tablet by mouth every morning (before breakfast)    Automatic Reconciliation, Ar   montelukast (SINGULAIR) 10 MG tablet Take 1 tablet by mouth every evening    Automatic Reconciliation, Ar   omeprazole (PRILOSEC) 10 MG delayed release capsule Take 1 capsule by mouth 2 times daily  Patient not taking: Reported on 2024    Automatic Reconciliation, Ar   phenytoin (DILANTIN) 100 MG ER capsule Take 1 capsule by mouth 2 times daily    Automatic Reconciliation, Ar   propranolol (INDERAL) 10 MG tablet Take 1 tablet by mouth 2 times daily    Automatic Reconciliation, Ar   RABEprazole (ACIPHEX) 20 MG tablet Take 1 tablet by mouth daily  Patient not taking: Reported on 2024   Automatic Reconciliation, Ar   rivaroxaban (XARELTO) 20 MG TABS tablet Take by mouth daily    Automatic Reconciliation, Ar       Current medications:    Current Facility-Administered Medications   Medication Dose Route Frequency Provider Last Rate Last Admin   • 0.9 % sodium chloride infusion   IntraVENous Continuous Romeo Ivory,

## 2024-10-08 NOTE — PLAN OF CARE
Problem: Chronic Conditions and Co-morbidities  Goal: Patient's chronic conditions and co-morbidity symptoms are monitored and maintained or improved  Outcome: Progressing  Flowsheets (Taken 10/8/2024 0600)  Care Plan - Patient's Chronic Conditions and Co-Morbidity Symptoms are Monitored and Maintained or Improved: Monitor and assess patient's chronic conditions and comorbid symptoms for stability, deterioration, or improvement     Problem: Pain  Goal: Verbalizes/displays adequate comfort level or baseline comfort level  Outcome: Progressing  Flowsheets (Taken 10/8/2024 0600)  Verbalizes/displays adequate comfort level or baseline comfort level:   Encourage patient to monitor pain and request assistance   Assess pain using appropriate pain scale

## 2024-10-08 NOTE — ANESTHESIA POSTPROCEDURE EVALUATION
Department of Anesthesiology  Postprocedure Note    Patient: Keila Alcazar  MRN: [id number]  YOB: 1950  Date of evaluation: 10/8/2024    Procedure Summary       Date: 10/08/24 Room / Location: [facility] ENDO 03 / [facility] ENDOSCOPY    Anesthesia Start: 1128 Anesthesia Stop: 1141    Procedures:       ESOPHAGOGASTRODUODENOSCOPY (Upper GI Region)      ESOPHAGOGASTRODUODENOSCOPY BIOPSY (Upper GI Region)      ESOPHAGOGASTRODUODENOSCOPY DILATION BALLOON (Upper GI Region) Diagnosis:       Abdominal pain, unspecified abdominal location      (Abdominal pain, unspecified abdominal location [R10.9])    Surgeons: Romeo Ivory MD Responsible Provider: Ana María Jimenez MD    Anesthesia Type: MAC ASA Status: 3            Anesthesia Type: MAC    Shanna Phase I: Shanna Score: 10    Shanna Phase II: Shanna Score: 10    Anesthesia Post Evaluation    Patient location during evaluation: PACU  Patient participation: complete - patient participated  Level of consciousness: awake  Airway patency: patent  Nausea & Vomiting: no vomiting and no nausea  Cardiovascular status: hemodynamically stable  Respiratory status: acceptable  Hydration status: stable  Pain management: adequate    No notable events documented.

## 2024-10-08 NOTE — PERIOP NOTE
Initial RN admission and assessment performed and documented in Endoscopy navigator.     Patient evaluated by anesthesia in pre-procedure holding.     All procedural vital signs, airway assessment, and level of consciousness information monitored and recorded by anesthesia staff on the anesthesia record.     Report received from CRNA post procedure.  Patient transported to recovery area by RN.    Endoscopy post procedure time out was performed and specimens were verified with physician.    Endoscope was pre-cleaned at bedside immediately following procedure by Harleen Cruz.

## 2024-10-08 NOTE — CARE COORDINATION
Care Management Initial Assessment  10/8/2024 11:42 AM  If patient is discharged prior to next notation, then this note serves as note for discharge by case management.    Reason for Admission:   Hiatal hernia [K44.9]  Abdominal pain, unspecified abdominal location [R10.9]  Procedure(s) (LRB):  ESOPHAGOGASTRODUODENOSCOPY (N/A)  ESOPHAGOGASTRODUODENOSCOPY BIOPSY (N/A)  ESOPHAGOGASTRODUODENOSCOPY DILATION BALLOON  Day of Surgery    Patient Admission Status: Inpatient  Date Admitted to INP: 10/07/2024  RUR: Readmission Risk Score: 8.7    Hospitalization in the last 30 days (Readmission):  No        Advance Care Planning:  Code Status: Full Code  Primary Healthcare Decision Maker: (P) Legal Next of Kin (patient said the formal document is available at the PCP office.  CM called and asked if they could provide a copy for the hospital chart.  Message left. (Dr. Anca Velasquez [phone]))   Advance Directive: Dr. Velasquez's office to fax over a copy.      __________________________________________________________________________  Assessment:      10/08/24 1134   Service Assessment   Patient Orientation Alert and Oriented   Cognition Alert   History Provided By Patient   Primary Caregiver Self  (has supportive friends and [employer] family.  son and daughter in law are primary contacts)   Support Systems Children;Christianity/[place];Friends/Neighbors   Patient's Healthcare Decision Maker is: Legal Next of Kin  (patient said the formal document is available at the PCP office.  CM called and asked if they could provide a copy for the hospital chart.  Message left. (Dr. Anca Velasquez [phone]))   PCP Verified by CM Yes   Last Visit to PCP Within last 3 months   Prior Functional Level Independent in ADLs/IADLs   Can patient return to prior living arrangement Yes   Ability to make needs known: Good   Family able to assist with home care needs: Yes  (as well as [employer] familiy and friends)   Would you like for me to

## 2024-10-09 PROCEDURE — 2500000003 HC RX 250 WO HCPCS: Performed by: NURSE PRACTITIONER

## 2024-10-09 PROCEDURE — 6370000000 HC RX 637 (ALT 250 FOR IP): Performed by: STUDENT IN AN ORGANIZED HEALTH CARE EDUCATION/TRAINING PROGRAM

## 2024-10-09 PROCEDURE — 2580000003 HC RX 258: Performed by: HOSPITALIST

## 2024-10-09 PROCEDURE — 6360000002 HC RX W HCPCS: Performed by: HOSPITALIST

## 2024-10-09 PROCEDURE — 1100000000 HC RM PRIVATE

## 2024-10-09 PROCEDURE — 94761 N-INVAS EAR/PLS OXIMETRY MLT: CPT

## 2024-10-09 RX ORDER — LEVOTHYROXINE SODIUM 137 UG/1
137 TABLET ORAL
COMMUNITY

## 2024-10-09 RX ORDER — HYDROMORPHONE HYDROCHLORIDE 1 MG/ML
0.25 INJECTION, SOLUTION INTRAMUSCULAR; INTRAVENOUS; SUBCUTANEOUS
Status: DISCONTINUED | OUTPATIENT
Start: 2024-10-09 | End: 2024-10-13 | Stop reason: HOSPADM

## 2024-10-09 RX ORDER — HYDROMORPHONE HYDROCHLORIDE 1 MG/ML
0.25 INJECTION, SOLUTION INTRAMUSCULAR; INTRAVENOUS; SUBCUTANEOUS ONCE
Status: DISCONTINUED | OUTPATIENT
Start: 2024-10-09 | End: 2024-10-09

## 2024-10-09 RX ADMIN — ONDANSETRON 4 MG: 2 INJECTION INTRAMUSCULAR; INTRAVENOUS at 07:56

## 2024-10-09 RX ADMIN — PROPRANOLOL HYDROCHLORIDE 10 MG: 10 TABLET ORAL at 21:59

## 2024-10-09 RX ADMIN — PHENYTOIN SODIUM 100 MG: 50 INJECTION INTRAMUSCULAR; INTRAVENOUS at 23:54

## 2024-10-09 RX ADMIN — PHENYTOIN SODIUM 100 MG: 50 INJECTION INTRAMUSCULAR; INTRAVENOUS at 00:02

## 2024-10-09 RX ADMIN — SODIUM CHLORIDE, PRESERVATIVE FREE 40 MG: 5 INJECTION INTRAVENOUS at 00:02

## 2024-10-09 RX ADMIN — PHENYTOIN SODIUM 100 MG: 50 INJECTION INTRAMUSCULAR; INTRAVENOUS at 15:32

## 2024-10-09 RX ADMIN — FENTANYL CITRATE 25 MCG: 50 INJECTION INTRAMUSCULAR; INTRAVENOUS at 20:09

## 2024-10-09 RX ADMIN — SODIUM CHLORIDE, PRESERVATIVE FREE 40 MG: 5 INJECTION INTRAVENOUS at 11:46

## 2024-10-09 RX ADMIN — SODIUM CHLORIDE, PRESERVATIVE FREE 40 MG: 5 INJECTION INTRAVENOUS at 23:54

## 2024-10-09 RX ADMIN — PHENYTOIN SODIUM 100 MG: 50 INJECTION INTRAMUSCULAR; INTRAVENOUS at 08:01

## 2024-10-09 RX ADMIN — FENTANYL CITRATE 25 MCG: 50 INJECTION INTRAMUSCULAR; INTRAVENOUS at 02:55

## 2024-10-09 RX ADMIN — SODIUM CHLORIDE, PRESERVATIVE FREE 10 ML: 5 INJECTION INTRAVENOUS at 07:57

## 2024-10-09 RX ADMIN — PROPRANOLOL HYDROCHLORIDE 10 MG: 10 TABLET ORAL at 08:04

## 2024-10-09 RX ADMIN — FENTANYL CITRATE 25 MCG: 50 INJECTION INTRAMUSCULAR; INTRAVENOUS at 08:00

## 2024-10-09 RX ADMIN — HYDROMORPHONE HYDROCHLORIDE 0.25 MG: 1 INJECTION, SOLUTION INTRAMUSCULAR; INTRAVENOUS; SUBCUTANEOUS at 23:49

## 2024-10-09 RX ADMIN — LEVOTHYROXINE SODIUM 137 MCG: 0.11 TABLET ORAL at 06:26

## 2024-10-09 RX ADMIN — FENTANYL CITRATE 25 MCG: 50 INJECTION INTRAMUSCULAR; INTRAVENOUS at 11:45

## 2024-10-09 RX ADMIN — FENTANYL CITRATE 25 MCG: 50 INJECTION INTRAMUSCULAR; INTRAVENOUS at 15:32

## 2024-10-09 ASSESSMENT — PAIN SCALES - GENERAL
PAINLEVEL_OUTOF10: 5
PAINLEVEL_OUTOF10: 7
PAINLEVEL_OUTOF10: 8
PAINLEVEL_OUTOF10: 0
PAINLEVEL_OUTOF10: 9
PAINLEVEL_OUTOF10: 7
PAINLEVEL_OUTOF10: 2
PAINLEVEL_OUTOF10: 5
PAINLEVEL_OUTOF10: 2
PAINLEVEL_OUTOF10: 7

## 2024-10-09 ASSESSMENT — PAIN DESCRIPTION - ORIENTATION
ORIENTATION: UPPER;MID
ORIENTATION: LOWER
ORIENTATION: LOWER
ORIENTATION: UPPER;MID
ORIENTATION: UPPER;MID

## 2024-10-09 ASSESSMENT — PAIN DESCRIPTION - DESCRIPTORS
DESCRIPTORS: ACHING
DESCRIPTORS: DULL

## 2024-10-09 ASSESSMENT — PAIN DESCRIPTION - LOCATION
LOCATION: ABDOMEN

## 2024-10-09 NOTE — PLAN OF CARE
Problem: Chronic Conditions and Co-morbidities  Goal: Patient's chronic conditions and co-morbidity symptoms are monitored and maintained or improved  Outcome: Progressing     Problem: Pain  Goal: Verbalizes/displays adequate comfort level or baseline comfort level  Outcome: Progressing     Problem: Safety - Adult  Goal: Free from fall injury  Outcome: Progressing     Problem: ABCDS Injury Assessment  Goal: Absence of physical injury  Outcome: Progressing     Problem: Skin/Tissue Integrity  Goal: Absence of new skin breakdown  Description: 1.  Monitor for areas of redness and/or skin breakdown  2.  Assess vascular access sites hourly  3.  Every 4-6 hours minimum:  Change oxygen saturation probe site  4.  Every 4-6 hours:  If on nasal continuous positive airway pressure, respiratory therapy assess nares and determine need for appliance change or resting period.  Outcome: Progressing

## 2024-10-09 NOTE — CARE COORDINATION
10/9/2024  2:00 PM  Care Management Progress Note    Reason for Admission:   Hiatal hernia [K44.9]  Abdominal pain, unspecified abdominal location [R10.9]  Procedure(s) (LRB):  ESOPHAGOGASTRODUODENOSCOPY (N/A)  ESOPHAGOGASTRODUODENOSCOPY BIOPSY (N/A)  ESOPHAGOGASTRODUODENOSCOPY DILATION BALLOON  1 Day Post-Op    Patient Admission Status: Inpatient  RUR: 11% Low Risk of Readmission  Hospitalization in the last 30 days (Readmission):  No        Transition of care plan:  Pt discussed in IDR is continuing to require medical management. Gen Surg plan for OR 10/10 hiatal hernia repair    DC when stable to home w/ family assistance son/daughter-in-law  Discharge plan communicated with patient and/or discharge caregiver: Yes    Date 1st IMM letter given: 10/8  Outpatient follow-up, surgery, PCP  Transport at discharge: Family   Please consult CM for any  DC needs  AYE Flores

## 2024-10-10 ENCOUNTER — ANESTHESIA EVENT (OUTPATIENT)
Facility: HOSPITAL | Age: 74
End: 2024-10-10
Payer: MEDICARE

## 2024-10-10 ENCOUNTER — ANESTHESIA (OUTPATIENT)
Facility: HOSPITAL | Age: 74
End: 2024-10-10
Payer: MEDICARE

## 2024-10-10 LAB
ANION GAP SERPL CALC-SCNC: 3 MMOL/L (ref 2–12)
BUN SERPL-MCNC: 3 MG/DL (ref 6–20)
BUN/CREAT SERPL: 5 (ref 12–20)
CALCIUM SERPL-MCNC: 7.3 MG/DL (ref 8.5–10.1)
CHLORIDE SERPL-SCNC: 109 MMOL/L (ref 97–108)
CO2 SERPL-SCNC: 26 MMOL/L (ref 21–32)
CREAT SERPL-MCNC: 0.6 MG/DL (ref 0.55–1.02)
ERYTHROCYTE [DISTWIDTH] IN BLOOD BY AUTOMATED COUNT: 13.1 % (ref 11.5–14.5)
GLUCOSE SERPL-MCNC: 119 MG/DL (ref 65–100)
HCT VFR BLD AUTO: 36.1 % (ref 35–47)
HGB BLD-MCNC: 12 G/DL (ref 11.5–16)
MCH RBC QN AUTO: 31.8 PG (ref 26–34)
MCHC RBC AUTO-ENTMCNC: 33.2 G/DL (ref 30–36.5)
MCV RBC AUTO: 95.8 FL (ref 80–99)
NRBC # BLD: 0 K/UL (ref 0–0.01)
NRBC BLD-RTO: 0 PER 100 WBC
PLATELET # BLD AUTO: 268 K/UL (ref 150–400)
PMV BLD AUTO: 8.3 FL (ref 8.9–12.9)
POTASSIUM SERPL-SCNC: 3.2 MMOL/L (ref 3.5–5.1)
RBC # BLD AUTO: 3.77 M/UL (ref 3.8–5.2)
SODIUM SERPL-SCNC: 138 MMOL/L (ref 136–145)
WBC # BLD AUTO: 8 K/UL (ref 3.6–11)

## 2024-10-10 PROCEDURE — 6360000002 HC RX W HCPCS: Performed by: HOSPITALIST

## 2024-10-10 PROCEDURE — 2500000003 HC RX 250 WO HCPCS: Performed by: NURSE ANESTHETIST, CERTIFIED REGISTERED

## 2024-10-10 PROCEDURE — 36415 COLL VENOUS BLD VENIPUNCTURE: CPT

## 2024-10-10 PROCEDURE — C1781 MESH (IMPLANTABLE): HCPCS | Performed by: SURGERY

## 2024-10-10 PROCEDURE — S2900 ROBOTIC SURGICAL SYSTEM: HCPCS | Performed by: SURGERY

## 2024-10-10 PROCEDURE — 6370000000 HC RX 637 (ALT 250 FOR IP): Performed by: STUDENT IN AN ORGANIZED HEALTH CARE EDUCATION/TRAINING PROGRAM

## 2024-10-10 PROCEDURE — P9045 ALBUMIN (HUMAN), 5%, 250 ML: HCPCS | Performed by: NURSE ANESTHETIST, CERTIFIED REGISTERED

## 2024-10-10 PROCEDURE — 6370000000 HC RX 637 (ALT 250 FOR IP): Performed by: HOSPITALIST

## 2024-10-10 PROCEDURE — 6360000002 HC RX W HCPCS: Performed by: NURSE ANESTHETIST, CERTIFIED REGISTERED

## 2024-10-10 PROCEDURE — 0DV44ZZ RESTRICTION OF ESOPHAGOGASTRIC JUNCTION, PERCUTANEOUS ENDOSCOPIC APPROACH: ICD-10-PCS | Performed by: SURGERY

## 2024-10-10 PROCEDURE — 2709999900 HC NON-CHARGEABLE SUPPLY: Performed by: SURGERY

## 2024-10-10 PROCEDURE — 8E0W4CZ ROBOTIC ASSISTED PROCEDURE OF TRUNK REGION, PERCUTANEOUS ENDOSCOPIC APPROACH: ICD-10-PCS | Performed by: SURGERY

## 2024-10-10 PROCEDURE — 3600000009 HC SURGERY ROBOT BASE: Performed by: SURGERY

## 2024-10-10 PROCEDURE — 3600000019 HC SURGERY ROBOT ADDTL 15MIN: Performed by: SURGERY

## 2024-10-10 PROCEDURE — 6370000000 HC RX 637 (ALT 250 FOR IP): Performed by: ANESTHESIOLOGY

## 2024-10-10 PROCEDURE — 7100000000 HC PACU RECOVERY - FIRST 15 MIN: Performed by: SURGERY

## 2024-10-10 PROCEDURE — 6360000002 HC RX W HCPCS: Performed by: STUDENT IN AN ORGANIZED HEALTH CARE EDUCATION/TRAINING PROGRAM

## 2024-10-10 PROCEDURE — 6360000002 HC RX W HCPCS: Performed by: ANESTHESIOLOGY

## 2024-10-10 PROCEDURE — 0BQT4ZZ REPAIR DIAPHRAGM, PERCUTANEOUS ENDOSCOPIC APPROACH: ICD-10-PCS | Performed by: SURGERY

## 2024-10-10 PROCEDURE — 80048 BASIC METABOLIC PNL TOTAL CA: CPT

## 2024-10-10 PROCEDURE — 2720000010 HC SURG SUPPLY STERILE: Performed by: SURGERY

## 2024-10-10 PROCEDURE — 3700000001 HC ADD 15 MINUTES (ANESTHESIA): Performed by: SURGERY

## 2024-10-10 PROCEDURE — 85027 COMPLETE CBC AUTOMATED: CPT

## 2024-10-10 PROCEDURE — 6370000000 HC RX 637 (ALT 250 FOR IP): Performed by: SURGERY

## 2024-10-10 PROCEDURE — C9290 INJ, BUPIVACAINE LIPOSOME: HCPCS | Performed by: SURGERY

## 2024-10-10 PROCEDURE — 2580000003 HC RX 258: Performed by: HOSPITALIST

## 2024-10-10 PROCEDURE — 7100000001 HC PACU RECOVERY - ADDTL 15 MIN: Performed by: SURGERY

## 2024-10-10 PROCEDURE — 6360000002 HC RX W HCPCS: Performed by: SURGERY

## 2024-10-10 PROCEDURE — 3700000000 HC ANESTHESIA ATTENDED CARE: Performed by: SURGERY

## 2024-10-10 PROCEDURE — 1100000000 HC RM PRIVATE

## 2024-10-10 DEVICE — MESH HERN REP DIA8CM P4HB MFIL RESRB RND W/ HYDRGEL BARR: Type: IMPLANTABLE DEVICE | Site: ABDOMEN | Status: FUNCTIONAL

## 2024-10-10 RX ORDER — MIDAZOLAM HYDROCHLORIDE 2 MG/2ML
2 INJECTION, SOLUTION INTRAMUSCULAR; INTRAVENOUS
Status: DISCONTINUED | OUTPATIENT
Start: 2024-10-10 | End: 2024-10-10

## 2024-10-10 RX ORDER — ALBUMIN, HUMAN INJ 5% 5 %
SOLUTION INTRAVENOUS
Status: DISCONTINUED | OUTPATIENT
Start: 2024-10-10 | End: 2024-10-10 | Stop reason: SDUPTHER

## 2024-10-10 RX ORDER — EPHEDRINE SULFATE/0.9% NACL/PF 25 MG/5 ML
SYRINGE (ML) INTRAVENOUS
Status: DISCONTINUED | OUTPATIENT
Start: 2024-10-10 | End: 2024-10-10 | Stop reason: SDUPTHER

## 2024-10-10 RX ORDER — KETOROLAC TROMETHAMINE 15 MG/ML
15 INJECTION, SOLUTION INTRAMUSCULAR; INTRAVENOUS ONCE
Status: COMPLETED | OUTPATIENT
Start: 2024-10-10 | End: 2024-10-10

## 2024-10-10 RX ORDER — NALOXONE HYDROCHLORIDE 0.4 MG/ML
INJECTION, SOLUTION INTRAMUSCULAR; INTRAVENOUS; SUBCUTANEOUS PRN
Status: DISCONTINUED | OUTPATIENT
Start: 2024-10-10 | End: 2024-10-10 | Stop reason: HOSPADM

## 2024-10-10 RX ORDER — FENTANYL CITRATE 50 UG/ML
INJECTION, SOLUTION INTRAMUSCULAR; INTRAVENOUS
Status: DISCONTINUED | OUTPATIENT
Start: 2024-10-10 | End: 2024-10-10 | Stop reason: SDUPTHER

## 2024-10-10 RX ORDER — FAMOTIDINE 10 MG/ML
INJECTION, SOLUTION INTRAVENOUS
Status: DISCONTINUED | OUTPATIENT
Start: 2024-10-10 | End: 2024-10-10 | Stop reason: SDUPTHER

## 2024-10-10 RX ORDER — SODIUM CHLORIDE, SODIUM LACTATE, POTASSIUM CHLORIDE, CALCIUM CHLORIDE 600; 310; 30; 20 MG/100ML; MG/100ML; MG/100ML; MG/100ML
INJECTION, SOLUTION INTRAVENOUS CONTINUOUS
Status: DISCONTINUED | OUTPATIENT
Start: 2024-10-10 | End: 2024-10-10 | Stop reason: HOSPADM

## 2024-10-10 RX ORDER — PHENYLEPHRINE HCL IN 0.9% NACL 0.4MG/10ML
SYRINGE (ML) INTRAVENOUS
Status: DISCONTINUED | OUTPATIENT
Start: 2024-10-10 | End: 2024-10-10 | Stop reason: SDUPTHER

## 2024-10-10 RX ORDER — ONDANSETRON 2 MG/ML
INJECTION INTRAMUSCULAR; INTRAVENOUS
Status: DISCONTINUED | OUTPATIENT
Start: 2024-10-10 | End: 2024-10-10 | Stop reason: SDUPTHER

## 2024-10-10 RX ORDER — LIDOCAINE HYDROCHLORIDE 20 MG/ML
INJECTION, SOLUTION EPIDURAL; INFILTRATION; INTRACAUDAL; PERINEURAL
Status: DISCONTINUED | OUTPATIENT
Start: 2024-10-10 | End: 2024-10-10 | Stop reason: SDUPTHER

## 2024-10-10 RX ORDER — ONDANSETRON 2 MG/ML
4 INJECTION INTRAMUSCULAR; INTRAVENOUS
Status: DISCONTINUED | OUTPATIENT
Start: 2024-10-10 | End: 2024-10-10 | Stop reason: HOSPADM

## 2024-10-10 RX ORDER — HYDROMORPHONE HYDROCHLORIDE 2 MG/1
2 TABLET ORAL EVERY 4 HOURS PRN
Status: DISCONTINUED | OUTPATIENT
Start: 2024-10-10 | End: 2024-10-13 | Stop reason: HOSPADM

## 2024-10-10 RX ORDER — HYDROMORPHONE HYDROCHLORIDE 2 MG/1
1 TABLET ORAL EVERY 4 HOURS PRN
Status: DISCONTINUED | OUTPATIENT
Start: 2024-10-10 | End: 2024-10-13 | Stop reason: HOSPADM

## 2024-10-10 RX ORDER — DEXAMETHASONE SODIUM PHOSPHATE 4 MG/ML
INJECTION, SOLUTION INTRA-ARTICULAR; INTRALESIONAL; INTRAMUSCULAR; INTRAVENOUS; SOFT TISSUE
Status: DISCONTINUED | OUTPATIENT
Start: 2024-10-10 | End: 2024-10-10 | Stop reason: SDUPTHER

## 2024-10-10 RX ORDER — CEFAZOLIN SODIUM 1 G/3ML
INJECTION, POWDER, FOR SOLUTION INTRAMUSCULAR; INTRAVENOUS
Status: DISCONTINUED | OUTPATIENT
Start: 2024-10-10 | End: 2024-10-10 | Stop reason: SDUPTHER

## 2024-10-10 RX ORDER — FENTANYL CITRATE 50 UG/ML
100 INJECTION, SOLUTION INTRAMUSCULAR; INTRAVENOUS
Status: DISCONTINUED | OUTPATIENT
Start: 2024-10-10 | End: 2024-10-10 | Stop reason: HOSPADM

## 2024-10-10 RX ORDER — PROPOFOL 10 MG/ML
INJECTION, EMULSION INTRAVENOUS
Status: DISCONTINUED | OUTPATIENT
Start: 2024-10-10 | End: 2024-10-10 | Stop reason: SDUPTHER

## 2024-10-10 RX ORDER — LIDOCAINE HYDROCHLORIDE 10 MG/ML
1 INJECTION, SOLUTION EPIDURAL; INFILTRATION; INTRACAUDAL; PERINEURAL
Status: DISCONTINUED | OUTPATIENT
Start: 2024-10-10 | End: 2024-10-10 | Stop reason: HOSPADM

## 2024-10-10 RX ORDER — ROCURONIUM BROMIDE 10 MG/ML
INJECTION, SOLUTION INTRAVENOUS
Status: DISCONTINUED | OUTPATIENT
Start: 2024-10-10 | End: 2024-10-10 | Stop reason: SDUPTHER

## 2024-10-10 RX ORDER — DIPHENHYDRAMINE HYDROCHLORIDE 50 MG/ML
12.5 INJECTION INTRAMUSCULAR; INTRAVENOUS
Status: DISCONTINUED | OUTPATIENT
Start: 2024-10-10 | End: 2024-10-10 | Stop reason: HOSPADM

## 2024-10-10 RX ORDER — HEPARIN SODIUM 5000 [USP'U]/ML
5000 INJECTION, SOLUTION INTRAVENOUS; SUBCUTANEOUS ONCE
Status: DISCONTINUED | OUTPATIENT
Start: 2024-10-10 | End: 2024-10-10

## 2024-10-10 RX ORDER — SUCCINYLCHOLINE/SOD CL,ISO/PF 100 MG/5ML
SYRINGE (ML) INTRAVENOUS
Status: DISCONTINUED | OUTPATIENT
Start: 2024-10-10 | End: 2024-10-10 | Stop reason: SDUPTHER

## 2024-10-10 RX ORDER — SCOLOPAMINE TRANSDERMAL SYSTEM 1 MG/1
1 PATCH, EXTENDED RELEASE TRANSDERMAL
Status: DISCONTINUED | OUTPATIENT
Start: 2024-10-10 | End: 2024-10-10 | Stop reason: HOSPADM

## 2024-10-10 RX ADMIN — ONDANSETRON 4 MG: 2 INJECTION INTRAMUSCULAR; INTRAVENOUS at 10:30

## 2024-10-10 RX ADMIN — CEFAZOLIN 2 G: 1 INJECTION, POWDER, FOR SOLUTION INTRAMUSCULAR; INTRAVENOUS at 10:40

## 2024-10-10 RX ADMIN — PHENYTOIN SODIUM 100 MG: 50 INJECTION INTRAMUSCULAR; INTRAVENOUS at 23:30

## 2024-10-10 RX ADMIN — ROCURONIUM BROMIDE 20 MG: 10 INJECTION, SOLUTION INTRAVENOUS at 12:55

## 2024-10-10 RX ADMIN — SODIUM CHLORIDE, PRESERVATIVE FREE 10 ML: 5 INJECTION INTRAVENOUS at 20:46

## 2024-10-10 RX ADMIN — KETOROLAC TROMETHAMINE 15 MG: 15 INJECTION, SOLUTION INTRAMUSCULAR; INTRAVENOUS at 16:04

## 2024-10-10 RX ADMIN — FENTANYL CITRATE 100 MCG: 50 INJECTION, SOLUTION INTRAMUSCULAR; INTRAVENOUS at 10:24

## 2024-10-10 RX ADMIN — Medication 120 MCG: at 10:39

## 2024-10-10 RX ADMIN — EPHEDRINE SULFATE 10 MG: 5 INJECTION INTRAVENOUS at 10:46

## 2024-10-10 RX ADMIN — FENTANYL CITRATE 100 MCG: 50 INJECTION, SOLUTION INTRAMUSCULAR; INTRAVENOUS at 13:30

## 2024-10-10 RX ADMIN — PROPOFOL 120 MG: 10 INJECTION, EMULSION INTRAVENOUS at 10:35

## 2024-10-10 RX ADMIN — SUGAMMADEX 200 MG: 100 INJECTION, SOLUTION INTRAVENOUS at 13:25

## 2024-10-10 RX ADMIN — DEXAMETHASONE SODIUM PHOSPHATE 8 MG: 4 INJECTION INTRA-ARTICULAR; INTRALESIONAL; INTRAMUSCULAR; INTRAVENOUS; SOFT TISSUE at 10:30

## 2024-10-10 RX ADMIN — LEVOTHYROXINE SODIUM 137 MCG: 0.11 TABLET ORAL at 05:08

## 2024-10-10 RX ADMIN — ROCURONIUM BROMIDE 40 MG: 10 INJECTION, SOLUTION INTRAVENOUS at 10:55

## 2024-10-10 RX ADMIN — HYDROMORPHONE HYDROCHLORIDE 0.5 MG: 1 INJECTION, SOLUTION INTRAMUSCULAR; INTRAVENOUS; SUBCUTANEOUS at 16:13

## 2024-10-10 RX ADMIN — PHENYTOIN SODIUM 100 MG: 50 INJECTION INTRAMUSCULAR; INTRAVENOUS at 17:36

## 2024-10-10 RX ADMIN — Medication 100 MG: at 10:35

## 2024-10-10 RX ADMIN — Medication 120 MCG: at 10:42

## 2024-10-10 RX ADMIN — HYDROMORPHONE HYDROCHLORIDE 2 MG: 2 TABLET ORAL at 23:29

## 2024-10-10 RX ADMIN — SODIUM CHLORIDE: 9 INJECTION, SOLUTION INTRAVENOUS at 02:02

## 2024-10-10 RX ADMIN — ROCURONIUM BROMIDE 30 MG: 10 INJECTION, SOLUTION INTRAVENOUS at 11:53

## 2024-10-10 RX ADMIN — ROCURONIUM BROMIDE 10 MG: 10 INJECTION, SOLUTION INTRAVENOUS at 10:35

## 2024-10-10 RX ADMIN — POTASSIUM BICARBONATE 40 MEQ: 782 TABLET, EFFERVESCENT ORAL at 06:13

## 2024-10-10 RX ADMIN — Medication 120 MCG: at 10:46

## 2024-10-10 RX ADMIN — PROPRANOLOL HYDROCHLORIDE 10 MG: 10 TABLET ORAL at 20:46

## 2024-10-10 RX ADMIN — FAMOTIDINE 20 MG: 10 INJECTION, SOLUTION INTRAVENOUS at 10:30

## 2024-10-10 RX ADMIN — ALBUMIN (HUMAN) 12.5 G: 12.5 INJECTION, SOLUTION INTRAVENOUS at 13:11

## 2024-10-10 RX ADMIN — HYDROMORPHONE HYDROCHLORIDE 2 MG: 2 TABLET ORAL at 19:09

## 2024-10-10 RX ADMIN — FENTANYL CITRATE 50 MCG: 50 INJECTION, SOLUTION INTRAMUSCULAR; INTRAVENOUS at 12:58

## 2024-10-10 RX ADMIN — LIDOCAINE HYDROCHLORIDE 100 MG: 20 INJECTION, SOLUTION EPIDURAL; INFILTRATION; INTRACAUDAL; PERINEURAL at 10:35

## 2024-10-10 RX ADMIN — SODIUM CHLORIDE, PRESERVATIVE FREE 40 MG: 5 INJECTION INTRAVENOUS at 17:36

## 2024-10-10 RX ADMIN — PHENYTOIN SODIUM 100 MG: 50 INJECTION INTRAMUSCULAR; INTRAVENOUS at 08:24

## 2024-10-10 RX ADMIN — PROPRANOLOL HYDROCHLORIDE 10 MG: 10 TABLET ORAL at 08:24

## 2024-10-10 ASSESSMENT — PAIN SCALES - GENERAL
PAINLEVEL_OUTOF10: 0
PAINLEVEL_OUTOF10: 4
PAINLEVEL_OUTOF10: 10
PAINLEVEL_OUTOF10: 4
PAINLEVEL_OUTOF10: 2
PAINLEVEL_OUTOF10: 9
PAINLEVEL_OUTOF10: 7

## 2024-10-10 ASSESSMENT — PAIN DESCRIPTION - FREQUENCY: FREQUENCY: INTERMITTENT

## 2024-10-10 ASSESSMENT — PAIN DESCRIPTION - LOCATION
LOCATION: ABDOMEN
LOCATION: ABDOMEN
LOCATION: SHOULDER
LOCATION: ABDOMEN;SHOULDER
LOCATION: ABDOMEN;SHOULDER

## 2024-10-10 ASSESSMENT — PAIN - FUNCTIONAL ASSESSMENT
PAIN_FUNCTIONAL_ASSESSMENT: 0-10
PAIN_FUNCTIONAL_ASSESSMENT: PREVENTS OR INTERFERES SOME ACTIVE ACTIVITIES AND ADLS

## 2024-10-10 ASSESSMENT — PAIN DESCRIPTION - DESCRIPTORS
DESCRIPTORS: SORE
DESCRIPTORS: ACHING;SORE
DESCRIPTORS: ACHING;DISCOMFORT
DESCRIPTORS: ACHING;DISCOMFORT
DESCRIPTORS: ACHING;BURNING
DESCRIPTORS: ACHING;SORE

## 2024-10-10 ASSESSMENT — PAIN DESCRIPTION - ORIENTATION
ORIENTATION: MID;UPPER;LEFT
ORIENTATION: LEFT;UPPER
ORIENTATION: UPPER;MID
ORIENTATION: LEFT;UPPER

## 2024-10-10 ASSESSMENT — PAIN DESCRIPTION - PAIN TYPE: TYPE: ACUTE PAIN

## 2024-10-10 ASSESSMENT — PAIN DESCRIPTION - ONSET: ONSET: ON-GOING

## 2024-10-10 NOTE — CARE COORDINATION
Care Management Progress Note    Reason for Admission:   Hiatal hernia [K44.9]  Abdominal pain, unspecified abdominal location [R10.9]  Procedure(s) (LRB):  ROBOT ASSISTED REPAIR OF RECURRENT PARAESOPHAGEAL HERNIA WITH NISSEN FUNDOPLICATION (N/A)  Day of Surgery    Patient Admission Status: Inpatient  RUR: 11%  Hospitalization in the last 30 days (Readmission):  No        Transition of care plan:  Clinical Updates:  Surgery today.  Hiatal Hernia repair.      Discharge Plan:   Anticipate no CM needs identified.    Discharge plan communicated with patient and/or discharge caregiver: Yes      Date last IMM letter given: 10/8/2024    Outpatient follow-up:  TBD    Transport at discharge: Family      Will continue to follow with DC needs.      ______________________________________  COLIN Dobson, RN-CM  [facility]- Care Management  Available via [employer]  10/10/2024  2:57 PM

## 2024-10-10 NOTE — BRIEF OP NOTE
Brief Postoperative Note      Patient: Keila Alcazar  YOB: 1950  MRN: [id number]    Date of Procedure: 10/10/2024    Pre-Op Diagnosis Codes:      * Paraesophageal hernia [K44.9]    Post-Op Diagnosis: Same       Procedure(s):  ROBOT ASSISTED REPAIR OF RECURRENT PARAESOPHAGEAL HERNIA WITH NISSEN FUNDOPLICATION    Surgeon(s):  Jacob Lees MD    Assistant:  Surgical Assistant: Yesenia Bishop  Resident: Anthony Cyr MD    Anesthesia: General    Estimated Blood Loss (mL): 100 cc  Complications: None    Specimens:   * No specimens in log *    Implants:  Implant Name Type Inv. Item Serial No.  Lot No. LRB No. Used Action   MESH IGOR REP DIA8CM P4HB MFIL RESRB RND W/ HYDRGEL العلي - [id number]  MESH IGOR REP DIA8CM P4HB MFIL RESRB RND W/ HYDRGEL العلي  BARD DAVOL-WD [id number] N/A 1 Implanted         Drains:   Urinary Catheter 10/10/24 2 Way (Active)   $ Urethral catheter insertion Inserted for procedure 10/10/24 1124   Catheter Indications Perioperative use for selected surgical procedures 10/10/24 1359   Site Assessment [place] 10/10/24 1124   Urine Color Yellow 10/10/24 1359   Urine Appearance Clear 10/10/24 1359   Catheter Care  Perineal wipes 10/10/24 1124   Catheter Best Practices  Drainage tube clipped to bed;Lack of dependent loop in tubing;Catheter secured to thigh;Drainage bag less than half full;Tamper seal intact;Bag below bladder;Bag not on floor 10/10/24 1124   Status Draining;Patent 10/10/24 1124       Findings:  Infection Present At Time Of Surgery (PATOS) (choose all levels that have infection present):  No infection present  Other Findings: Wrap unraveled in chest, type 3    Electronically signed by Jacob Lees MD on 10/10/2024 at 4:31 PM    [id number]

## 2024-10-10 NOTE — PERIOP NOTE
TRANSFER - OUT REPORT:    Verbal report given to ANTWAN Brooks on Keila H Ottoniel  being transferred to 5th floor for routine post-op       Report consisted of patient's Situation, Background, Assessment and   Recommendations(SBAR).     Information from the following report(s) Nurse Handoff Report, Adult Overview, Surgery Report, Intake/Output, and MAR was reviewed with the receiving nurse.           Lines:   Peripheral IV 10/10/24 Left;Posterior Hand (Active)   Site Assessment Clean, dry & intact 10/10/24 1359   Line Status Infusing 10/10/24 1359   Line Care Connections checked and tightened 10/10/24 1359   Phlebitis Assessment No symptoms 10/10/24 1359   Infiltration Assessment 0 10/10/24 1359   Alcohol Cap Used Yes 10/10/24 1359   Dressing Status Clean, dry & intact 10/10/24 1359   Dressing Type Transparent 10/10/24 1359   Dressing Intervention New 10/10/24 1018        Opportunity for questions and clarification was provided.      Patient transported with:  O2 @ 2lpm and Registered Nurse

## 2024-10-10 NOTE — ANESTHESIA PRE PROCEDURE
Department of Anesthesiology  Preprocedure Note       Name:  Keila Alcazar   Age:  74 y.o.  :  1950                                          MRN:  [id number]         Date:  10/10/2024      Surgeon: Surgeon(s):  Jacob Lees MD    Procedure: Procedure(s):  ROBOT ASSISTED REPAIR OF RECURRENT PARAESOPHAGEAL HERNIA WITH NISSEN FUNDOPLICATION    Medications prior to admission:   Prior to Admission medications    Medication Sig Start Date End Date Taking? Authorizing Provider   levothyroxine (SYNTHROID) 137 MCG tablet Take 1 tablet by mouth every morning (before breakfast)   Yes Provider, MD Abran   Calcium Carbonate-Vitamin D (OYSTER SHELL CALCIUM/D) 500-5 MG-MCG TABS Take 1 tablet by mouth daily   Yes Automatic Reconciliation, Ar   fexofenadine (ALLEGRA) 180 MG tablet Take 1 tablet by mouth daily   Yes Automatic Reconciliation, Ar   montelukast (SINGULAIR) 10 MG tablet Take 1 tablet by mouth every evening   Yes Automatic Reconciliation, Ar   omeprazole (PRILOSEC) 10 MG delayed release capsule Take 1 capsule by mouth 2 times daily   Yes Automatic Reconciliation, Ar   phenytoin (DILANTIN) 100 MG ER capsule Take 1 capsule by mouth 2 times daily   Yes Automatic Reconciliation, Ar   propranolol (INDERAL) 10 MG tablet Take 1 tablet by mouth 2 times daily   Yes Automatic Reconciliation, Ar   rivaroxaban (XARELTO) 20 MG TABS tablet Take by mouth daily   Yes Automatic Reconciliation, Ar   Carbidopa-Levodopa ER 36. MG CPCR Take 1 tablet by mouth in the morning, at noon, and at bedtime  Patient not taking: Reported on 10/8/2024 6/7/24   Gina Cline APRN - NP   RABEprazole (ACIPHEX) 20 MG tablet Take 1 tablet by mouth daily  Patient not taking: Reported on 2024   Automatic Reconciliation, Ar       Current medications:    Current Facility-Administered Medications   Medication Dose Route Frequency Provider Last Rate Last Admin   • lidocaine PF 1 % injection 1 mL  1 mL IntraDERmal Once PRN

## 2024-10-10 NOTE — ANESTHESIA POSTPROCEDURE EVALUATION
Department of Anesthesiology  Postprocedure Note    Patient: Keila Alcazar  MRN: [id number]  YOB: 1950  Date of evaluation: 10/10/2024    Procedure Summary       Date: 10/10/24 Room / Location: [facility] MAIN OR 6 / [facility] MAIN OR    Anesthesia Start: 1025 Anesthesia Stop: 1409    Procedure: ROBOT ASSISTED REPAIR OF RECURRENT PARAESOPHAGEAL HERNIA WITH NISSEN FUNDOPLICATION (Abdomen) Diagnosis:       Paraesophageal hernia      (Paraesophageal hernia [K44.9])    Surgeons: Jacob Lees MD Responsible Provider: Tyron Guthrie MD    Anesthesia Type: General ASA Status: 3            Anesthesia Type: General    Shanna Phase I: Shanna Score: 8    Shanna Phase II: Shanna Score: 10    Anesthesia Post Evaluation    Patient location during evaluation: PACU  Patient participation: complete - patient participated  Level of consciousness: awake  Pain score: 3  Airway patency: patent  Nausea & Vomiting: no vomiting and no nausea  Cardiovascular status: hemodynamically stable  Respiratory status: room air  Hydration status: stable  Multimodal analgesia pain management approach    No notable events documented.

## 2024-10-11 ENCOUNTER — APPOINTMENT (OUTPATIENT)
Facility: HOSPITAL | Age: 74
DRG: 328 | End: 2024-10-11
Payer: MEDICARE

## 2024-10-11 PROBLEM — Z86.711 HX PULMONARY EMBOLISM: Status: ACTIVE | Noted: 2024-10-11

## 2024-10-11 PROBLEM — R07.9 CHEST PAIN: Status: ACTIVE | Noted: 2024-10-11

## 2024-10-11 PROBLEM — K21.9 GERD (GASTROESOPHAGEAL REFLUX DISEASE): Status: ACTIVE | Noted: 2024-10-11

## 2024-10-11 LAB
ANION GAP SERPL CALC-SCNC: 3 MMOL/L (ref 2–12)
BUN SERPL-MCNC: 9 MG/DL (ref 6–20)
BUN/CREAT SERPL: 11 (ref 12–20)
CALCIUM SERPL-MCNC: 6.9 MG/DL (ref 8.5–10.1)
CHLORIDE SERPL-SCNC: 105 MMOL/L (ref 97–108)
CO2 SERPL-SCNC: 26 MMOL/L (ref 21–32)
COMMENT:: NORMAL
CREAT SERPL-MCNC: 0.83 MG/DL (ref 0.55–1.02)
EKG ATRIAL RATE: 64 BPM
EKG DIAGNOSIS: NORMAL
EKG P AXIS: 62 DEGREES
EKG P-R INTERVAL: 154 MS
EKG Q-T INTERVAL: 442 MS
EKG QRS DURATION: 90 MS
EKG QTC CALCULATION (BAZETT): 455 MS
EKG R AXIS: 0 DEGREES
EKG T AXIS: 16 DEGREES
EKG VENTRICULAR RATE: 64 BPM
ERYTHROCYTE [DISTWIDTH] IN BLOOD BY AUTOMATED COUNT: 13.2 % (ref 11.5–14.5)
GLUCOSE BLD STRIP.AUTO-MCNC: 116 MG/DL (ref 65–117)
GLUCOSE SERPL-MCNC: 135 MG/DL (ref 65–100)
HCT VFR BLD AUTO: 37 % (ref 35–47)
HGB BLD-MCNC: 12.4 G/DL (ref 11.5–16)
MCH RBC QN AUTO: 32.7 PG (ref 26–34)
MCHC RBC AUTO-ENTMCNC: 33.5 G/DL (ref 30–36.5)
MCV RBC AUTO: 97.6 FL (ref 80–99)
NRBC # BLD: 0 K/UL (ref 0–0.01)
NRBC BLD-RTO: 0 PER 100 WBC
PLATELET # BLD AUTO: 260 K/UL (ref 150–400)
PMV BLD AUTO: 8.4 FL (ref 8.9–12.9)
POTASSIUM SERPL-SCNC: 3.8 MMOL/L (ref 3.5–5.1)
RBC # BLD AUTO: 3.79 M/UL (ref 3.8–5.2)
SERVICE CMNT-IMP: NORMAL
SODIUM SERPL-SCNC: 134 MMOL/L (ref 136–145)
SPECIMEN HOLD: NORMAL
TROPONIN I SERPL HS-MCNC: 35 NG/L (ref 0–51)
WBC # BLD AUTO: 10.6 K/UL (ref 3.6–11)

## 2024-10-11 PROCEDURE — 6360000002 HC RX W HCPCS

## 2024-10-11 PROCEDURE — 6360000002 HC RX W HCPCS: Performed by: HOSPITALIST

## 2024-10-11 PROCEDURE — 2700000000 HC OXYGEN THERAPY PER DAY

## 2024-10-11 PROCEDURE — 1100000000 HC RM PRIVATE

## 2024-10-11 PROCEDURE — 97530 THERAPEUTIC ACTIVITIES: CPT

## 2024-10-11 PROCEDURE — 2500000003 HC RX 250 WO HCPCS: Performed by: NURSE PRACTITIONER

## 2024-10-11 PROCEDURE — 71045 X-RAY EXAM CHEST 1 VIEW: CPT

## 2024-10-11 PROCEDURE — 97161 PT EVAL LOW COMPLEX 20 MIN: CPT

## 2024-10-11 PROCEDURE — 6370000000 HC RX 637 (ALT 250 FOR IP): Performed by: STUDENT IN AN ORGANIZED HEALTH CARE EDUCATION/TRAINING PROGRAM

## 2024-10-11 PROCEDURE — 82962 GLUCOSE BLOOD TEST: CPT

## 2024-10-11 PROCEDURE — 84484 ASSAY OF TROPONIN QUANT: CPT

## 2024-10-11 PROCEDURE — 51798 US URINE CAPACITY MEASURE: CPT

## 2024-10-11 PROCEDURE — 6370000000 HC RX 637 (ALT 250 FOR IP): Performed by: SURGERY

## 2024-10-11 PROCEDURE — 2580000003 HC RX 258: Performed by: HOSPITALIST

## 2024-10-11 PROCEDURE — 6370000000 HC RX 637 (ALT 250 FOR IP): Performed by: HOSPITALIST

## 2024-10-11 PROCEDURE — 94761 N-INVAS EAR/PLS OXIMETRY MLT: CPT

## 2024-10-11 PROCEDURE — 36415 COLL VENOUS BLD VENIPUNCTURE: CPT

## 2024-10-11 PROCEDURE — 93005 ELECTROCARDIOGRAM TRACING: CPT | Performed by: INTERNAL MEDICINE

## 2024-10-11 PROCEDURE — 85027 COMPLETE CBC AUTOMATED: CPT

## 2024-10-11 PROCEDURE — 80048 BASIC METABOLIC PNL TOTAL CA: CPT

## 2024-10-11 RX ORDER — PHENYTOIN SODIUM 100 MG/1
200 CAPSULE, EXTENDED RELEASE ORAL EVERY EVENING
Status: DISCONTINUED | OUTPATIENT
Start: 2024-10-11 | End: 2024-10-13 | Stop reason: HOSPADM

## 2024-10-11 RX ORDER — KETOROLAC TROMETHAMINE 30 MG/ML
15 INJECTION, SOLUTION INTRAMUSCULAR; INTRAVENOUS EVERY 6 HOURS
Status: DISCONTINUED | OUTPATIENT
Start: 2024-10-11 | End: 2024-10-13 | Stop reason: HOSPADM

## 2024-10-11 RX ORDER — PHENYTOIN SODIUM 100 MG/1
100 CAPSULE, EXTENDED RELEASE ORAL DAILY
Status: DISCONTINUED | OUTPATIENT
Start: 2024-10-12 | End: 2024-10-13 | Stop reason: HOSPADM

## 2024-10-11 RX ADMIN — HYDROMORPHONE HYDROCHLORIDE 2 MG: 2 TABLET ORAL at 20:44

## 2024-10-11 RX ADMIN — ACETAMINOPHEN 650 MG: 325 TABLET ORAL at 15:32

## 2024-10-11 RX ADMIN — SODIUM CHLORIDE, PRESERVATIVE FREE 5 ML: 5 INJECTION INTRAVENOUS at 08:43

## 2024-10-11 RX ADMIN — HYDROMORPHONE HYDROCHLORIDE 2 MG: 2 TABLET ORAL at 03:45

## 2024-10-11 RX ADMIN — SODIUM CHLORIDE, PRESERVATIVE FREE 40 MG: 5 INJECTION INTRAVENOUS at 05:52

## 2024-10-11 RX ADMIN — HYDROMORPHONE HYDROCHLORIDE 2 MG: 2 TABLET ORAL at 16:33

## 2024-10-11 RX ADMIN — KETOROLAC TROMETHAMINE 15 MG: 30 INJECTION, SOLUTION INTRAMUSCULAR at 15:30

## 2024-10-11 RX ADMIN — PHENYTOIN SODIUM 100 MG: 50 INJECTION INTRAMUSCULAR; INTRAVENOUS at 08:42

## 2024-10-11 RX ADMIN — LEVOTHYROXINE SODIUM 137 MCG: 0.11 TABLET ORAL at 05:49

## 2024-10-11 RX ADMIN — KETOROLAC TROMETHAMINE 15 MG: 30 INJECTION, SOLUTION INTRAMUSCULAR at 22:02

## 2024-10-11 RX ADMIN — PHENYTOIN SODIUM 100 MG: 50 INJECTION INTRAMUSCULAR; INTRAVENOUS at 15:30

## 2024-10-11 RX ADMIN — PHENYTOIN SODIUM 200 MG: 100 CAPSULE ORAL at 17:43

## 2024-10-11 RX ADMIN — PROPRANOLOL HYDROCHLORIDE 10 MG: 10 TABLET ORAL at 20:44

## 2024-10-11 RX ADMIN — RIVAROXABAN 20 MG: 20 TABLET, FILM COATED ORAL at 16:33

## 2024-10-11 RX ADMIN — HYDROMORPHONE HYDROCHLORIDE 0.25 MG: 1 INJECTION, SOLUTION INTRAMUSCULAR; INTRAVENOUS; SUBCUTANEOUS at 13:15

## 2024-10-11 RX ADMIN — SODIUM CHLORIDE, PRESERVATIVE FREE 40 MG: 5 INJECTION INTRAVENOUS at 17:43

## 2024-10-11 RX ADMIN — HYDROMORPHONE HYDROCHLORIDE 2 MG: 2 TABLET ORAL at 11:17

## 2024-10-11 ASSESSMENT — PAIN DESCRIPTION - ORIENTATION
ORIENTATION: LEFT
ORIENTATION: MID
ORIENTATION: UPPER;RIGHT

## 2024-10-11 ASSESSMENT — PAIN SCALES - GENERAL
PAINLEVEL_OUTOF10: 8
PAINLEVEL_OUTOF10: 6
PAINLEVEL_OUTOF10: 5
PAINLEVEL_OUTOF10: 5
PAINLEVEL_OUTOF10: 10
PAINLEVEL_OUTOF10: 8
PAINLEVEL_OUTOF10: 6
PAINLEVEL_OUTOF10: 6
PAINLEVEL_OUTOF10: 5
PAINLEVEL_OUTOF10: 6
PAINLEVEL_OUTOF10: 7

## 2024-10-11 ASSESSMENT — PAIN DESCRIPTION - LOCATION
LOCATION: CHEST
LOCATION: ABDOMEN
LOCATION: CHEST
LOCATION: ABDOMEN
LOCATION: ABDOMEN
LOCATION: SHOULDER;ABDOMEN
LOCATION: ABDOMEN
LOCATION: CHEST

## 2024-10-11 ASSESSMENT — PAIN DESCRIPTION - FREQUENCY: FREQUENCY: INTERMITTENT

## 2024-10-11 ASSESSMENT — PAIN DESCRIPTION - ONSET: ONSET: ON-GOING

## 2024-10-11 ASSESSMENT — PAIN DESCRIPTION - PAIN TYPE: TYPE: ACUTE PAIN

## 2024-10-11 ASSESSMENT — PAIN DESCRIPTION - DESCRIPTORS: DESCRIPTORS: ACHING;DISCOMFORT

## 2024-10-11 NOTE — CARE COORDINATION
Care Management Progress Note    Reason for Admission:   Hiatal hernia [K44.9]  Abdominal pain, unspecified abdominal location [R10.9]  Procedure(s) (LRB):  ROBOT ASSISTED REPAIR OF RECURRENT PARAESOPHAGEAL HERNIA WITH NISSEN FUNDOPLICATION (N/A)  1 Day Post-Op    Patient Admission Status: Inpatient  RUR: 9%  Hospitalization in the last 30 days (Readmission):  No        Transition of care plan:  Clinical Updates:  s/p Hiatal Hernia repair 10/10/24.  Pending general sx to clear.  GI cleared.     Discharge Plan:    Home with no CM needs.  Patient has been cleared by PT.     Discharge plan communicated with patient and/or discharge caregiver: Yes      Date last IMM letter given: 10/11/2024    Outpatient follow-up:  TBD    Transport at discharge: Family      Please reach out to CM if any DC needs arise.    ______________________________________  COLIN Dobson, RN-CM  [facility]- Care Management  Available via [employer]  10/11/2024  12:32 PM

## 2024-10-11 NOTE — OP NOTE
[facility]  [facility]  Romeo Ivory MD  [phone]      2024    Esophagogastroduodenoscopy (EGD) Procedure Note  Keila Alcazar  : 1950  [facility] Medical Record Number: [id number]      Indications:   Chest pain, dysphagia, hiatus hernia noted on CT.  Referring Physician:  Anca Velasquez MD  Anesthesia/Sedation:  Conscious sedation/deep sedation/monitored anesthesia -- see notes.  Endoscopist:  Dr. Romeo Ivory  Complications:  None  Estimated Blood Loss:  None    Permit:  The indications, risks, benefits and alternatives were reviewed with the patient or their decision maker who was provided an opportunity to ask questions and all questions were answered.  The specific risks of esophagogastroduodenoscopy with conscious sedation were reviewed, including but not limited to anesthetic complication, bleeding, adverse drug reaction, missed lesion, infection, IV site reactions, and intestinal perforation which would lead to the need for surgical repair.  Alternatives to EGD including radiographic imaging, observation without testing, or laboratory testing were reviewed as well as the limitations of those alternatives discussed.  After considering the options and having all their questions answered, the patient or their decision maker provided both verbal and written consent to proceed.       Procedure in Detail:  After obtaining informed consent, positioning of the patient in the left lateral decubitus position, and conduction of a pre-procedure pause or \"time out\" the endoscope was introduced into the mouth and advanced to the duodenum.  A careful inspection was made, and findings or interventions are described below.    Findings:   Esophagus:EG junction displaced to 25cm, seems consistent with 15cm hiatal hernia.  At the EG junction there is no inflammatory or erosive change but an esophageal ring noted 
cardia with interrupted 0 Ethibond sutures.  A 3 cm, floppy 360 degree wrap was created.   Hemostasis was satisfactory.  The wrap was then sutured to the right crura and the left crura with interrupted 0 Ethibond stitch.  Sponge, instrument, and needle counts were correct. The gastroesophageal fat pad was retrieved and discarded. Insufflation was ended.  Skin was closed with 4-0 Monocryl subcuticular suture and glue.  She tolerated the procedure well and went to the PACU in stable condition.  Duggan catheter remained.    PRIMARY CARE PROVIDER:  Penelope Velasquez MD.    GASTROENTEROLOGIST:  Romeo Ivory MD.    SURGICAL ASSISTANT:  Yesenia Cleaning.    RESIDENT:  MD SREE Rasheed MD      BAJ/AQS  D:  10/10/2024 16:30:57  T:  10/10/2024 23:07:55  JOB #:  [id number]    CC:   Anca Velasquez MD

## 2024-10-11 NOTE — PLAN OF CARE
Problem: Chronic Conditions and Co-morbidities  Goal: Patient's chronic conditions and co-morbidity symptoms are monitored and maintained or improved  Outcome: Progressing  Flowsheets  Taken 10/10/2024 1645 by Cecilia Keyes RN  Care Plan - Patient's Chronic Conditions and Co-Morbidity Symptoms are Monitored and Maintained or Improved: Monitor and assess patient's chronic conditions and comorbid symptoms for stability, deterioration, or improvement  Taken 10/10/2024 0800 by Cecilia Keyes RN  Care Plan - Patient's Chronic Conditions and Co-Morbidity Symptoms are Monitored and Maintained or Improved: Monitor and assess patient's chronic conditions and comorbid symptoms for stability, deterioration, or improvement     Problem: Pain  Goal: Verbalizes/displays adequate comfort level or baseline comfort level  Outcome: Progressing     Problem: Safety - Adult  Goal: Free from fall injury  Outcome: Progressing     Problem: ABCDS Injury Assessment  Goal: Absence of physical injury  Outcome: Progressing     Problem: Skin/Tissue Integrity  Goal: Absence of new skin breakdown  Description: 1.  Monitor for areas of redness and/or skin breakdown  2.  Assess vascular access sites hourly  3.  Every 4-6 hours minimum:  Change oxygen saturation probe site  4.  Every 4-6 hours:  If on nasal continuous positive airway pressure, respiratory therapy assess nares and determine need for appliance change or resting period.  Outcome: Progressing     Problem: Nutrition Deficit:  Goal: Optimize nutritional status  Outcome: Progressing

## 2024-10-12 LAB
ALBUMIN SERPL-MCNC: 3.1 G/DL (ref 3.5–5)
ALBUMIN/GLOB SERPL: 0.9 (ref 1.1–2.2)
ALP SERPL-CCNC: 50 U/L (ref 45–117)
ALT SERPL-CCNC: 22 U/L (ref 12–78)
ANION GAP SERPL CALC-SCNC: 5 MMOL/L (ref 2–12)
APPEARANCE UR: CLEAR
AST SERPL-CCNC: 34 U/L (ref 15–37)
BACTERIA URNS QL MICRO: NEGATIVE /HPF
BILIRUB DIRECT SERPL-MCNC: 0.2 MG/DL (ref 0–0.2)
BILIRUB SERPL-MCNC: 0.7 MG/DL (ref 0.2–1)
BILIRUB UR QL: NEGATIVE
BUN SERPL-MCNC: 13 MG/DL (ref 6–20)
BUN/CREAT SERPL: 18 (ref 12–20)
CALCIUM SERPL-MCNC: 6.8 MG/DL (ref 8.5–10.1)
CHLORIDE SERPL-SCNC: 106 MMOL/L (ref 97–108)
CO2 SERPL-SCNC: 21 MMOL/L (ref 21–32)
COLOR UR: ABNORMAL
CREAT SERPL-MCNC: 0.74 MG/DL (ref 0.55–1.02)
EPITH CASTS URNS QL MICRO: ABNORMAL /LPF
ERYTHROCYTE [DISTWIDTH] IN BLOOD BY AUTOMATED COUNT: 13.2 % (ref 11.5–14.5)
GLOBULIN SER CALC-MCNC: 3.5 G/DL (ref 2–4)
GLUCOSE SERPL-MCNC: 108 MG/DL (ref 65–100)
GLUCOSE UR STRIP.AUTO-MCNC: NEGATIVE MG/DL
HCT VFR BLD AUTO: 36.1 % (ref 35–47)
HGB BLD-MCNC: 12.1 G/DL (ref 11.5–16)
HGB UR QL STRIP: ABNORMAL
HYALINE CASTS URNS QL MICRO: ABNORMAL /LPF (ref 0–2)
KETONES UR QL STRIP.AUTO: NEGATIVE MG/DL
LEUKOCYTE ESTERASE UR QL STRIP.AUTO: ABNORMAL
MCH RBC QN AUTO: 32.7 PG (ref 26–34)
MCHC RBC AUTO-ENTMCNC: 33.5 G/DL (ref 30–36.5)
MCV RBC AUTO: 97.6 FL (ref 80–99)
NITRITE UR QL STRIP.AUTO: NEGATIVE
NRBC # BLD: 0 K/UL (ref 0–0.01)
NRBC BLD-RTO: 0 PER 100 WBC
PH UR STRIP: 6 (ref 5–8)
PLATELET # BLD AUTO: 245 K/UL (ref 150–400)
PMV BLD AUTO: 8.3 FL (ref 8.9–12.9)
POTASSIUM SERPL-SCNC: 3.5 MMOL/L (ref 3.5–5.1)
PROT SERPL-MCNC: 6.6 G/DL (ref 6.4–8.2)
PROT UR STRIP-MCNC: ABNORMAL MG/DL
RBC # BLD AUTO: 3.7 M/UL (ref 3.8–5.2)
RBC #/AREA URNS HPF: ABNORMAL /HPF (ref 0–5)
SODIUM SERPL-SCNC: 132 MMOL/L (ref 136–145)
SP GR UR REFRACTOMETRY: 1.01 (ref 1–1.03)
URINE CULTURE IF INDICATED: ABNORMAL
UROBILINOGEN UR QL STRIP.AUTO: 1 EU/DL (ref 0.2–1)
WBC # BLD AUTO: 14 K/UL (ref 3.6–11)
WBC URNS QL MICRO: ABNORMAL /HPF (ref 0–4)

## 2024-10-12 PROCEDURE — 6370000000 HC RX 637 (ALT 250 FOR IP): Performed by: STUDENT IN AN ORGANIZED HEALTH CARE EDUCATION/TRAINING PROGRAM

## 2024-10-12 PROCEDURE — 94761 N-INVAS EAR/PLS OXIMETRY MLT: CPT

## 2024-10-12 PROCEDURE — 6360000002 HC RX W HCPCS: Performed by: HOSPITALIST

## 2024-10-12 PROCEDURE — 80048 BASIC METABOLIC PNL TOTAL CA: CPT

## 2024-10-12 PROCEDURE — 2580000003 HC RX 258: Performed by: INTERNAL MEDICINE

## 2024-10-12 PROCEDURE — 81001 URINALYSIS AUTO W/SCOPE: CPT

## 2024-10-12 PROCEDURE — 36415 COLL VENOUS BLD VENIPUNCTURE: CPT

## 2024-10-12 PROCEDURE — 6360000002 HC RX W HCPCS

## 2024-10-12 PROCEDURE — 85027 COMPLETE CBC AUTOMATED: CPT

## 2024-10-12 PROCEDURE — 2580000003 HC RX 258: Performed by: HOSPITALIST

## 2024-10-12 PROCEDURE — 80076 HEPATIC FUNCTION PANEL: CPT

## 2024-10-12 PROCEDURE — 1100000000 HC RM PRIVATE

## 2024-10-12 RX ORDER — CALCIUM GLUCONATE 20 MG/ML
1000 INJECTION, SOLUTION INTRAVENOUS ONCE
Status: CANCELLED | OUTPATIENT
Start: 2024-10-12 | End: 2024-10-12

## 2024-10-12 RX ADMIN — SODIUM CHLORIDE, PRESERVATIVE FREE 40 MG: 5 INJECTION INTRAVENOUS at 06:17

## 2024-10-12 RX ADMIN — SODIUM CHLORIDE, PRESERVATIVE FREE 40 MG: 5 INJECTION INTRAVENOUS at 17:17

## 2024-10-12 RX ADMIN — PHENYTOIN SODIUM 200 MG: 100 CAPSULE ORAL at 17:16

## 2024-10-12 RX ADMIN — LEVOTHYROXINE SODIUM 137 MCG: 0.11 TABLET ORAL at 06:14

## 2024-10-12 RX ADMIN — RIVAROXABAN 20 MG: 20 TABLET, FILM COATED ORAL at 08:33

## 2024-10-12 RX ADMIN — SODIUM CHLORIDE: 9 INJECTION, SOLUTION INTRAVENOUS at 03:16

## 2024-10-12 RX ADMIN — SODIUM CHLORIDE, PRESERVATIVE FREE 10 ML: 5 INJECTION INTRAVENOUS at 19:49

## 2024-10-12 RX ADMIN — SODIUM CHLORIDE, PRESERVATIVE FREE 10 ML: 5 INJECTION INTRAVENOUS at 08:28

## 2024-10-12 RX ADMIN — PROPRANOLOL HYDROCHLORIDE 10 MG: 10 TABLET ORAL at 19:49

## 2024-10-12 RX ADMIN — PHENYTOIN SODIUM 100 MG: 100 CAPSULE ORAL at 08:32

## 2024-10-12 RX ADMIN — KETOROLAC TROMETHAMINE 15 MG: 30 INJECTION, SOLUTION INTRAMUSCULAR at 03:12

## 2024-10-12 RX ADMIN — KETOROLAC TROMETHAMINE 15 MG: 30 INJECTION, SOLUTION INTRAMUSCULAR at 19:49

## 2024-10-12 RX ADMIN — KETOROLAC TROMETHAMINE 15 MG: 30 INJECTION, SOLUTION INTRAMUSCULAR at 08:22

## 2024-10-12 RX ADMIN — KETOROLAC TROMETHAMINE 15 MG: 30 INJECTION, SOLUTION INTRAMUSCULAR at 15:51

## 2024-10-12 RX ADMIN — PROPRANOLOL HYDROCHLORIDE 10 MG: 10 TABLET ORAL at 08:33

## 2024-10-12 ASSESSMENT — PAIN DESCRIPTION - PAIN TYPE
TYPE: SURGICAL PAIN
TYPE: SURGICAL PAIN

## 2024-10-12 ASSESSMENT — PAIN SCALES - GENERAL
PAINLEVEL_OUTOF10: 2
PAINLEVEL_OUTOF10: 0
PAINLEVEL_OUTOF10: 5
PAINLEVEL_OUTOF10: 4
PAINLEVEL_OUTOF10: 0

## 2024-10-12 ASSESSMENT — PAIN DESCRIPTION - LOCATION
LOCATION: CHEST;ABDOMEN
LOCATION: ABDOMEN
LOCATION: ABDOMEN

## 2024-10-12 ASSESSMENT — PAIN - FUNCTIONAL ASSESSMENT
PAIN_FUNCTIONAL_ASSESSMENT: ACTIVITIES ARE NOT PREVENTED
PAIN_FUNCTIONAL_ASSESSMENT: ACTIVITIES ARE NOT PREVENTED

## 2024-10-12 ASSESSMENT — PAIN DESCRIPTION - ORIENTATION
ORIENTATION: MID;UPPER
ORIENTATION: MID;UPPER

## 2024-10-12 ASSESSMENT — PAIN DESCRIPTION - FREQUENCY
FREQUENCY: INTERMITTENT
FREQUENCY: INTERMITTENT

## 2024-10-12 ASSESSMENT — PAIN DESCRIPTION - DESCRIPTORS
DESCRIPTORS: SORE
DESCRIPTORS: SORE

## 2024-10-12 ASSESSMENT — PAIN DESCRIPTION - ONSET
ONSET: GRADUAL
ONSET: GRADUAL

## 2024-10-13 VITALS
WEIGHT: 175 LBS | SYSTOLIC BLOOD PRESSURE: 121 MMHG | TEMPERATURE: 99.4 F | OXYGEN SATURATION: 93 % | BODY MASS INDEX: 31.01 KG/M2 | HEART RATE: 68 BPM | HEIGHT: 63 IN | RESPIRATION RATE: 16 BRPM | DIASTOLIC BLOOD PRESSURE: 64 MMHG

## 2024-10-13 PROCEDURE — 6360000002 HC RX W HCPCS

## 2024-10-13 PROCEDURE — 6370000000 HC RX 637 (ALT 250 FOR IP): Performed by: STUDENT IN AN ORGANIZED HEALTH CARE EDUCATION/TRAINING PROGRAM

## 2024-10-13 PROCEDURE — 6360000002 HC RX W HCPCS: Performed by: HOSPITALIST

## 2024-10-13 PROCEDURE — 6360000002 HC RX W HCPCS: Performed by: STUDENT IN AN ORGANIZED HEALTH CARE EDUCATION/TRAINING PROGRAM

## 2024-10-13 PROCEDURE — 2580000003 HC RX 258: Performed by: HOSPITALIST

## 2024-10-13 RX ORDER — CALCIUM GLUCONATE 20 MG/ML
2000 INJECTION, SOLUTION INTRAVENOUS ONCE
Status: COMPLETED | OUTPATIENT
Start: 2024-10-13 | End: 2024-10-13

## 2024-10-13 RX ORDER — HYDROMORPHONE HYDROCHLORIDE 2 MG/1
1 TABLET ORAL EVERY 6 HOURS PRN
Qty: 6 TABLET | Refills: 0 | Status: SHIPPED | OUTPATIENT
Start: 2024-10-13 | End: 2024-10-16

## 2024-10-13 RX ORDER — CALCIUM GLUCONATE 20 MG/ML
1000 INJECTION, SOLUTION INTRAVENOUS ONCE
Status: COMPLETED | OUTPATIENT
Start: 2024-10-13 | End: 2024-10-13

## 2024-10-13 RX ADMIN — SODIUM CHLORIDE, PRESERVATIVE FREE 40 MG: 5 INJECTION INTRAVENOUS at 05:12

## 2024-10-13 RX ADMIN — PROPRANOLOL HYDROCHLORIDE 10 MG: 10 TABLET ORAL at 08:31

## 2024-10-13 RX ADMIN — CALCIUM GLUCONATE 2000 MG: 20 INJECTION, SOLUTION INTRAVENOUS at 08:30

## 2024-10-13 RX ADMIN — LEVOTHYROXINE SODIUM 137 MCG: 0.11 TABLET ORAL at 05:12

## 2024-10-13 RX ADMIN — SODIUM CHLORIDE, PRESERVATIVE FREE 10 ML: 5 INJECTION INTRAVENOUS at 08:33

## 2024-10-13 RX ADMIN — CALCIUM GLUCONATE 1000 MG: 20 INJECTION, SOLUTION INTRAVENOUS at 10:22

## 2024-10-13 RX ADMIN — PHENYTOIN SODIUM 100 MG: 100 CAPSULE ORAL at 08:31

## 2024-10-13 RX ADMIN — KETOROLAC TROMETHAMINE 15 MG: 30 INJECTION, SOLUTION INTRAMUSCULAR at 11:24

## 2024-10-13 RX ADMIN — RIVAROXABAN 20 MG: 20 TABLET, FILM COATED ORAL at 08:31

## 2024-10-13 RX ADMIN — KETOROLAC TROMETHAMINE 15 MG: 30 INJECTION, SOLUTION INTRAMUSCULAR at 02:31

## 2024-10-13 ASSESSMENT — PAIN DESCRIPTION - ORIENTATION: ORIENTATION: MID

## 2024-10-13 ASSESSMENT — PAIN DESCRIPTION - LOCATION: LOCATION: ABDOMEN

## 2024-10-13 ASSESSMENT — PAIN DESCRIPTION - FREQUENCY: FREQUENCY: INTERMITTENT

## 2024-10-13 ASSESSMENT — PAIN DESCRIPTION - PAIN TYPE: TYPE: SURGICAL PAIN

## 2024-10-13 ASSESSMENT — PAIN SCALES - GENERAL: PAINLEVEL_OUTOF10: 2

## 2024-10-13 ASSESSMENT — PAIN - FUNCTIONAL ASSESSMENT: PAIN_FUNCTIONAL_ASSESSMENT: ACTIVITIES ARE NOT PREVENTED

## 2024-10-13 ASSESSMENT — PAIN DESCRIPTION - DESCRIPTORS: DESCRIPTORS: SORE

## 2024-10-13 ASSESSMENT — PAIN DESCRIPTION - ONSET: ONSET: GRADUAL

## 2024-10-13 NOTE — DISCHARGE INSTRUCTIONS
HOSPITALIST DISCHARGE INSTRUCTIONS  NAME:  Keila Alcazar   :  1950   MRN:  [id number]     Date/Time:  10/13/2024 7:06 AM    ADMIT DATE: 10/7/2024     DISCHARGE DATE: 10/13/2024     DISCHARGE DIAGNOSIS:  Large hiatal hernia    DISCHARGE INSTRUCTIONS:  Thank you for allowing us to participate in your care. Your discharging Hospitalist is Geoff Lopez MD. You were admitted for evaluation and treatment of the above. You underwent a hiatal hernia repair and your symptoms improved. You are well enough to be discharged. Please take your meds as prescribed and follow up with your PCP and surgery.      MEDICATIONS:    It is important that you take the medication exactly as they are prescribed.   Keep your medication in the bottles provided by the pharmacist and keep a list of the medication names, dosages, and times to be taken in your wallet.   Do not take other medications without consulting your doctor.             If you experience any of the following symptoms then please call your primary care physician or return to the emergency room if you cannot get hold of your doctor:  Fever, chills, nausea, vomiting, diarrhea, change in mentation, falling, bleeding, shortness of breath    Follow Up:  Please call the below provider to arrange hospital follow up appointment      Anca Velasquez MD  [address] [facility] [address] [phone]    Schedule an appointment as soon as possible for a visit      Melvi Yoon MD  [id number] [address] [address] [phone]    Schedule an appointment as soon as possible for a visit          Information obtained by :  I understand that if any problems occur once I am at home I am to contact my physician.    I understand and acknowledge receipt of the instructions indicated above.                                                                                                                                           Physician's or R.N.'s

## 2024-10-13 NOTE — PLAN OF CARE
Problem: Chronic Conditions and Co-morbidities  Goal: Patient's chronic conditions and co-morbidity symptoms are monitored and maintained or improved  Outcome: Progressing     Problem: Pain  Goal: Verbalizes/displays adequate comfort level or baseline comfort level  Outcome: Progressing  Flowsheets  Taken 10/12/2024 1622 by Jaison Harris RN  Verbalizes/displays adequate comfort level or baseline comfort level:   Encourage patient to monitor pain and request assistance   Assess pain using appropriate pain scale   Administer analgesics based on type and severity of pain and evaluate response   Implement non-pharmacological measures as appropriate and evaluate response   Consider cultural and social influences on pain and pain management   Notify Licensed Independent Practitioner if interventions unsuccessful or patient reports new pain  Taken 10/12/2024 1551 by Jaison Harris RN  Verbalizes/displays adequate comfort level or baseline comfort level:   Encourage patient to monitor pain and request assistance   Assess pain using appropriate pain scale   Administer analgesics based on type and severity of pain and evaluate response   Implement non-pharmacological measures as appropriate and evaluate response   Consider cultural and social influences on pain and pain management   Notify Licensed Independent Practitioner if interventions unsuccessful or patient reports new pain     Problem: Safety - Adult  Goal: Free from fall injury  Outcome: Progressing     Problem: ABCDS Injury Assessment  Goal: Absence of physical injury  Outcome: Progressing     Problem: Skin/Tissue Integrity  Goal: Absence of new skin breakdown  Description: 1.  Monitor for areas of redness and/or skin breakdown  2.  Assess vascular access sites hourly  3.  Every 4-6 hours minimum:  Change oxygen saturation probe site  4.  Every 4-6 hours:  If on nasal continuous positive airway pressure, respiratory therapy assess nares and determine need for

## 2024-10-13 NOTE — DISCHARGE SUMMARY
Hospitalist Discharge Summary     Patient ID:  Keila Alcazar  [id number]  74 y.o.  1950    Admit date: 10/7/2024    Discharge date and time: 10/13/2024    Admission Diagnoses: Hiatal hernia [K44.9]  Abdominal pain, unspecified abdominal location [R10.9]    Discharge Diagnoses:    Principal Problem:    Hiatal hernia  Active Problems:    Chest pain    GERD (gastroesophageal reflux disease)    Hx pulmonary embolism  Resolved Problems:    * No resolved hospital problems. *         Hospital Course:   74-year-old female with history of hypothyroidism, seizure disorder,pulmonary embolism, GERD, essential tremors comes to the hospital with abdominal pain and was found to have a large hiatal hernia.  Patient is admitted for IV Protonix, pain control, GI and surgery consult.     #Abdominal pain  #Large hiatal hernia s/p  ROBOT ASSISTED REPAIR OF RECURRENT PARAESOPHAGEAL HERNIA WITH NISSEN FUNDOPLICATION   EGD 10/8/2024: Large hiatus hernia and esophageal ring, s/p dilatation  Xarelto restarted 10/11   Plan  Continue PPI  Advance diet as per general surgery rec (pt tolerating full liquids)  Postop expected leukocytosis  Discharged with gensurg follow up and PCP follow up  Check CBC at PCP follow up     #Chest pain  DD postsurgical pain, cardiac versus new PE  -Overnight 10/10/2024 patient had acute complaint of retrosternal chest pain, patient was evaluated at bedside and chest x-ray was clear except for atelectasis, troponin were negative  -Patient's vital signs continue to be stable with heart rate 60s, patient is not hypoxic  -Xarelto restarted 10/11  -resolved now     #Hypocalcemia  Patient is taking Dilantin for seizures  Corrected calcium in the 7s  Repleted  Check calcium at PCP follow up     #Hypothyroidism  Patient is taking Synthroid 150 mcg daily.  Continue home med     # Seizures  Patient is taking Dilantin at home  Patient had seizure activity when she had pulmonary embolism.  She was seen neurology at

## 2024-10-13 NOTE — PROGRESS NOTES
Flora Sanchez PA-C                       [phone] office             Monday-Friday 8:00 am-4:30 pm  I am not permitted to use \"perfect serve\" use above for contact, thanks.        Gastroenterology Progress Note    October 9, 2024  Admit Date: 10/7/2024         Narrative Assessment and Plan   74 y.o. female admitted with abdominal pain, nausea, vomiting.  CT on admission with no acute abdominal findings, does show evidence of large hiatal hernia which was previously known.  EGD by Dr. Ivory yesterday with findings suggestive of 15 cm hiatal hernia, no inflammatory or erosive changes at EG junction.  There was an  esophageal ring identified, which was dilated to 20 mm.      Impression:  Large hiatal hernia  Abdominal pain  Esophageal ring-s/p dilation    Plan:  Continue with full liquid diet today, n.p.o. past midnight tonight.  The patient has been assessed by general surgery with tentative plans for OR tomorrow.  Continue pantoprazole 40 mg every 12 hours, continue to hold Xarelto.  Inpatient GI to follow peripherally, please call if needed.    Flora Sanchez PA-C      Regretfully my efforts yesterday have not altered her symptoms.  I suggest hiatus hernia repair is the most appropriate next step.  Romeo Ivory MD     Subjective:   Chief Complaint: Follow-up abdominal pain    HPI: Patient is sitting up in bed at the time of my visit, reports that her epigastric/lower chest pain is persistent.  She is currently on full liquid diet but is not eating much.    EGD by Dr. Ivory 10/8/2024:  Findings:   Esophagus:EG junction displaced to 25cm, seems consistent with 15cm hiatal hernia.  At the EG junction there is no inflammatory or erosive change but an esophageal ring noted with estimated diameter of lumen 17mm.  This is biopsied with cold forceps and dilated to 20mm to see if that improves her symptoms.   
                                                                                                 Flora Sanchez PA-C                       [phone] office             Monday-Friday 8:00 am-4:30 pm  I am not permitted to use \"perfect serve\" use above for contact, thanks.        Gastroenterology Progress Note    October 11, 2024  Admit Date: 10/7/2024         Narrative Assessment and Plan   74 y.o. female admitted with epigastric pain, nausea, vomiting, assessed with EGD with findings of large hiatal hernia and distal esophageal ring s/p dilation.  General surgery consulted for hiatal hernia repair, which was performed yesterday.  Her abdominal pain, nausea, dysphagia have significantly improved admitted complains of right shoulder pain.    Impression:  Hiatal hernia s/p surgical repair 10/10/2024    Plan:  I have contacted our office to arrange for outpatient follow-up with [facility] for GERD.  Defer postsurgical management and diet to general surgery.  Please continue pantoprazole 40 mg at least once daily.  Inpatient GI to sign off, please reconsult if needed further.    Flora Sanchez PA-C    Subjective:   Chief Complaint: Follow-up abdominal pain/hiatal hernia    HPI: Patient sitting up in chair, her friend is at bedside.  She tells me that her abdominal pain, nausea, and difficulty with swallowing have significantly improved after hiatal hernia repair.  Her main complaint today is pain that started in her chest and is now in her right shoulder, likely attributable to gas after surgery.    ROS:  The previous review of systems on initial consultation / H&P is noted and reviewed.  Specific changes noted above in HPI.    Current Medications:     Current Facility-Administered Medications   Medication Dose Route Frequency    HYDROmorphone (DILAUDID) tablet 1 mg  1 mg Oral Q4H PRN    And    HYDROmorphone (DILAUDID) tablet 2 mg  2 mg Oral Q4H PRN    HYDROmorphone (DILAUDID) injection 0.25 mg  0.25 mg IntraVENous 
                                                             [facility] CAST[facility]  [address] [address], [place] [id number] [phone]      Hospitalist  Progress Note      NAME:       Keila Alcazar   :        1950  MRM:        [id number]    Date of service: 10/11/2024      Subjective: Patient seen and examined by me. Responded to a RRT for chest pain. Patient stated she was experiencing mid chest tightness and no fever or chills. She had previously complained of a left shoulder discomfort. No cough or fever. She is sp a hernia repair      Objective:    Vital Signs:    /68   Pulse 60   Temp 98.8 °F (37.1 °C) (Oral)   Resp 17   Ht 1.6 m (5' 2.99\")   Wt 79.4 kg (175 lb)   SpO2 96%   BMI 31.01 kg/m²        Intake/Output Summary (Last 24 hours) at 10/11/2024 045  Last data filed at 10/10/2024 2040  Gross per 24 hour   Intake --   Output 1225 ml   Net -1225 ml        Current inpatient medications reviewed:  Current Facility-Administered Medications   Medication Dose Route Frequency    HYDROmorphone (DILAUDID) tablet 1 mg  1 mg Oral Q4H PRN    And    HYDROmorphone (DILAUDID) tablet 2 mg  2 mg Oral Q4H PRN    HYDROmorphone (DILAUDID) injection 0.25 mg  0.25 mg IntraVENous Once    HYDROmorphone HCl PF (DILAUDID) injection 0.25 mg  0.25 mg IntraVENous Q3H PRN    propranolol (INDERAL) tablet 10 mg  10 mg Oral BID    prochlorperazine (COMPAZINE) injection 10 mg  10 mg IntraVENous Q6H PRN    levothyroxine (SYNTHROID) tablet 137 mcg  137 mcg Oral QAM AC    sodium chloride flush 0.9 % injection 5-40 mL  5-40 mL IntraVENous 2 times per day    sodium chloride flush 0.9 % injection 5-40 mL  5-40 mL IntraVENous PRN    0.9 % sodium chloride infusion   IntraVENous PRN    potassium chloride (KLOR-CON) extended release tablet 40 mEq  40 mEq Oral PRN    Or    potassium bicarb-citric acid (EFFER-K) effervescent tablet 40 mEq  40 mEq Oral PRN    Or    potassium chloride 10 mEq/100 mL IVPB 
    Hospitalist Progress Note      NAME:  Keila Alcazar   :  1950  MRM:  [id number]    Date/Time: 10/8/2024  12:43 PM           Assessment / Plan:     74-year-old female with history of hypothyroidism, seizure disorder,pulmonary embolism, GERD, essential tremors comes to the hospital with abdominal pain and was found to have a large hiatal hernia.  Patient is admitted for IV Protonix, pain control, GI and surgery consult.     #Abdominal pain  #Large hiatal hernia   S/P EGD: Large hiatus hernia and esophageal ring.   Plan  Liquid diet   Gen surgery consulted: OR Thursday   Continue IV protonix   Continue to hold Xarelto     #Hypothyroidism  Patient is taking Synthroid 150 mcg daily.  Continue home med     # Seizures  Patient is taking Dilantin at home  Patient had seizure activity when she had pulmonary embolism.  [facility].  Switch dilantin to iv q8h.     #  History of pulmonary embolism x2   Patient is taking Xarelto.  Patient is taking Xarelto for last 23 years due to prior pulmonary embolism.  Holding Xarelto at this time.     # GERD  Patient takes PPI at home.    # Essential tremors  Patient is taking propranolol at home.       #BMI (Calculated): 31.01    I have personally reviewed the radiographs, laboratory data in Epic and decisions and statements above are based partially on this personal interpretation.                 Care Plan discussed with: Patient    Discussed:  Care Plan    Prophylaxis:  SCD's    Disposition:  Home w/Family           ___________________________________________________    Attending Physician: Lenard Raymundo MD        Subjective:     Chief Complaint:  Abdominal pain    ROS:  (bold if positive, if negative)    Tolerating PT  Tolerating Diet          Objective:   S/P EGD today     Vitals:          Last 24hrs VS reviewed since prior progress note. Most recent are:    Vitals:    10/08/24 1229   BP: (!) 150/77   Pulse: 64   Resp: 18   Temp: 98.2 °F 
    Hospitalist Progress Note      NAME:  Keila Alcazar   :  1950  MRM:  [id number]    Date/Time: 10/11/2024  10:39 AM           Assessment / Plan:     74-year-old female with history of hypothyroidism, seizure disorder,pulmonary embolism, GERD, essential tremors comes to the hospital with abdominal pain and was found to have a large hiatal hernia.  Patient is admitted for IV Protonix, pain control, GI and surgery consult.     #Abdominal pain  #Large hiatal hernia s/p  ROBOT ASSISTED REPAIR OF RECURRENT PARAESOPHAGEAL HERNIA WITH NISSEN FUNDOPLICATION   EGD 10/8/2024: Large hiatus hernia and esophageal ring, s/p dilatation  Plan  Continue IV protonix   Continue to hold Xarelto, until cleared by GEN surge    #Chest pain  DD postsurgical pain, cardiac versus new PE  -Overnight 10/10/2024 patient had acute complaint of retrosternal chest pain, patient was evaluated at bedside and chest x-ray was clear except for atelectasis, troponin were negative  Evaluation at bedside this morning patient reports resolution of her abdominal pain, however she continues to report left upper shoulder pain  -Patient's vital signs continue to be stable with heart rate 60s, patient is not hypoxic  Will  -Continue to monitor  -Restart Xarelto when cleared by general surgery    #Hypothyroidism  Patient is taking Synthroid 150 mcg daily.  Continue home med     # Seizures  Patient is taking Dilantin at home  Patient had seizure activity when she had pulmonary embolism.  She was seen neurology at [facility] in the past.  Switch dilantin to iv q8h, will plan to switch to p.o. when patient can tolerate p.o.     #  History of pulmonary embolism x2   Patient is taking Xarelto.  Patient is taking Xarelto for last 23 years due to prior pulmonary embolism.  Holding Xarelto at this time.     # GERD  Patient takes PPI at home.    # Essential tremors  Patient is taking propranolol at home.       #BMI (Calculated): 31.01    I have personally reviewed 
    Hospitalist Progress Note      NAME:  Keila Alcazar   :  1950  MRM:  [id number]    Date/Time: 10/12/2024  10:39 AM           Assessment / Plan:     74-year-old female with history of hypothyroidism, seizure disorder,pulmonary embolism, GERD, essential tremors comes to the hospital with abdominal pain and was found to have a large hiatal hernia.  Patient is admitted for IV Protonix, pain control, GI and surgery consult.     #Abdominal pain  #Large hiatal hernia s/p  ROBOT ASSISTED REPAIR OF RECURRENT PARAESOPHAGEAL HERNIA WITH NISSEN FUNDOPLICATION   EGD 10/8/2024: Large hiatus hernia and esophageal ring, s/p dilatation  Xarelto restarted 10/11   Plan  Continue IV protonix   Advance diet as per general surgery rec (pt tolerating full liquids)  Leukocytosis increased, expected after surgery, continue to monitor    #Chest pain  DD postsurgical pain, cardiac versus new PE  -Overnight 10/10/2024 patient had acute complaint of retrosternal chest pain, patient was evaluated at bedside and chest x-ray was clear except for atelectasis, troponin were negative  -Patient's vital signs continue to be stable with heart rate 60s, patient is not hypoxic  -Xarelto restarted 10/11  -No complaint of shoulder pain this am; surgical pain minor during night, none at time of examination  Plan  -Continue to monitor    #Hypocalcemia  Patient is taking Dilantin for seizures  Calcium down to 6.8   Plan  Check albumin  Give calcium gluconate if needed  Continue to monitor    #Hypothyroidism  Patient is taking Synthroid 150 mcg daily.  Continue home med     # Seizures  Patient is taking Dilantin at home  Patient had seizure activity when she had pulmonary embolism.  [facility].  Switch dilantin to iv q8h, will plan to switch to p.o. when patient can tolerate p.o.     #  History of pulmonary embolism x2   Patient is taking Xarelto.  Patient is taking Xarelto for last 23 years due to prior pulmonary 
    Hospitalist Progress Note      NAME:  Keila Alcazar   :  1950  MRM:  [id number]    Date/Time: 10/10/2024  11:20 AM           Assessment / Plan:     74-year-old female with history of hypothyroidism, seizure disorder,pulmonary embolism, GERD, essential tremors comes to the hospital with abdominal pain and was found to have a large hiatal hernia.  Patient is admitted for IV Protonix, pain control, GI and surgery consult.     #Abdominal pain  #Large hiatal hernia   S/P EGD 10/8/2024: Large hiatus hernia and esophageal ring, s/p dilatation  Plan  Gen surgery consulted: OR today  Continue IV protonix   Continue to hold Xarelto     #Hypothyroidism  Patient is taking Synthroid 150 mcg daily.  Continue home med     # Seizures  Patient is taking Dilantin at home  Patient had seizure activity when she had pulmonary embolism.  [facility].  Switch dilantin to iv q8h.     #  History of pulmonary embolism x2   Patient is taking Xarelto.  Patient is taking Xarelto for last 23 years due to prior pulmonary embolism.  Holding Xarelto at this time.     # GERD  Patient takes PPI at home.    # Essential tremors  Patient is taking propranolol at home.       #BMI (Calculated): 31.01    I have personally reviewed the radiographs, laboratory data in Epic and decisions and statements above are based partially on this personal interpretation.                 Care Plan discussed with: Patient    Discussed:  Care Plan    Prophylaxis:  SCD's    Disposition:  Home w/Family           ___________________________________________________    Attending Physician: Lenard Raymundo MD        Subjective:     Chief Complaint:  Abdominal pain    ROS:  (bold if positive, if negative)    Tolerating PT  Tolerating Diet          Objective:   Patient reported had a good night sleep after taking Dilaudid last night, she reports the first time in 2 months she was able to get some sleep, however she still was still not able 
0315: patients complaints of chest pain and difficulty sucking the straw to drink water  0316: called for RRT  0329: EKG taken  0326: seen by RRT, requested for Trop and EKG  0336: EKG showed NSR no abnormality has been found  0345: still noted with shoulder and chest pain, administered dilaudid as PRN for pain  0400: seen pt asleep comfortably        
0900 Pt made aware of discharge orders following scheduled dose of Calcium Gluconate. Pts yovani is close friend, Korin, and will be taking her home once scheduled dose is finished.   
1614:RN made Dr Raymundo aware that kolb removed  at 0850 and has been drinking and receiving IV fluids, said she does not have the urge to urinate  40 ml via bladder scan and patient said she has a history of prolapsed bladder MD advised to recheck bladder scan at 2000  
Comprehensive Nutrition Assessment    Type and Reason for Visit: Initial, Positive Nutrition Screen    Nutrition Recommendations/Plan:   Continue Full liquids currently - NPO 10/10 for surgery  Add Ensure PLUS hp        Malnutrition Assessment:  Malnutrition Status:  At risk for malnutrition (Comment) (10/09/24 1327)    Context:  Acute Illness     Findings of the 6 clinical characteristics of malnutrition:  Energy Intake:  75% or less of estimated energy requirements for 7 or more days  Weight Loss:  No significant weight loss     Body Fat Loss:  No significant body fat loss     Muscle Mass Loss:  No significant muscle mass loss    Fluid Accumulation:  No significant fluid accumulation     Strength:  Not Performed       Nutrition Assessment:    Past medical hx:       Diagnosis Date    Chronic pain     rsd in left foot    GERD (gastroesophageal reflux disease)     Liver disease hx of several pe,s    Other ill-defined conditions(149.89)     essential tremors    Seasonal allergic rhinitis     Seizures (HCC)     2002    Stroke (HCC) 1990    Thromboembolus (HCC)     pulmary embolism    Thyroid disease     hypothriodism       MST for wt loss received. Pt down 11 lbs (5.9%) over the last year and no significant wt change since last month. Poor PO PTA 2/2 hiatal hernia. Pt NPO for repair tomorrow and Full liquids today. Pt had EGD and ws NPO Oct 7th and half of the 8th. Monitor PO today and after surgery. Start Ensure HP for ease of kcal/pro intake until after surgery and diet advances. Recommend GI bland/Soft diet after surgery. No known food allergies. Last BM 2 days ago.        Current Nutrition Therapies:  ADULT DIET; Full Liquid  Diet NPO Exceptions are: Ice Chips, Sips of Water with Meds  ADULT ORAL NUTRITION SUPPLEMENT; Dinner, Lunch, Breakfast; Low Calorie/High Protein Oral Supplement  Meal Intake:   Patient Vitals for the past 168 hrs:   PO Meals Eaten (%)   10/08/24 1232 1 - 25%     Supplement Intake:  No data 
Consult received, images reviewed.  Will see this afternoon.     [id number]  
Department of General Surgery - Adult  Surgical Service    Progress Note      SUBJECTIVE:  Feeling much better today.  Tolerating fulls.  Minimal pain    OBJECTIVE      Physical    VITALS:  /76   Pulse 73   Temp 98.2 °F (36.8 °C) (Oral)   Resp 18   Ht 1.6 m (5' 2.99\")   Wt 79.4 kg (175 lb)   SpO2 95%   BMI 31.01 kg/m²   INTAKE/OUTPUT:    Intake/Output Summary (Last 24 hours) at 10/12/2024 0858  Last data filed at 10/12/2024 0600  Gross per 24 hour   Intake 1384.02 ml   Output --   Net 1384.02 ml     ABDOMEN:  soft, no distention, incisions C/D/I  Data  CBC with Differential:    Lab Results   Component Value Date/Time    WBC 14.0 10/12/2024 03:21 AM    RBC 3.70 10/12/2024 03:21 AM    HGB 12.1 10/12/2024 03:21 AM    HCT 36.1 10/12/2024 03:21 AM     10/12/2024 03:21 AM    MCV 97.6 10/12/2024 03:21 AM    MCH 32.7 10/12/2024 03:21 AM    MCHC 33.5 10/12/2024 03:21 AM    RDW 13.2 10/12/2024 03:21 AM    NRBC 0.0 10/12/2024 03:21 AM    NRBC 0.00 10/12/2024 03:21 AM    LYMPHOPCT 17 10/08/2024 03:30 AM    MONOPCT 9 10/08/2024 03:30 AM    EOSPCT 1 10/08/2024 03:30 AM    BASOPCT 0 10/08/2024 03:30 AM    MONOSABS 0.6 10/08/2024 03:30 AM    LYMPHSABS 1.2 10/08/2024 03:30 AM    EOSABS 0.0 10/08/2024 03:30 AM    BASOSABS 0.0 10/08/2024 03:30 AM    DIFFTYPE AUTOMATED 10/08/2024 03:30 AM     BMP:    Lab Results   Component Value Date/Time     10/12/2024 03:21 AM    K 3.5 10/12/2024 03:21 AM     10/12/2024 03:21 AM    CO2 21 10/12/2024 03:21 AM    BUN 13 10/12/2024 03:21 AM    CREATININE 0.74 10/12/2024 03:21 AM    CALCIUM 6.8 10/12/2024 03:21 AM    GFRAA >60 07/26/2022 01:56 PM    LABGLOM 86 10/12/2024 03:21 AM    GLUCOSE 108 10/12/2024 03:21 AM       Current Inpatient Medications    Current Facility-Administered Medications: rivaroxaban (XARELTO) tablet 20 mg, 20 mg, Oral, Daily  ketorolac (TORADOL) injection 15 mg, 15 mg, IntraVENous, Q6H  phenytoin (DILANTIN) ER capsule 200 mg, 200 mg, Oral, 
Department of General Surgery - Adult  Surgical Service    Progress Note      SUBJECTIVE:  Feeling well.  Tolerating diet    OBJECTIVE      Physical    INTAKE/OUTPUT:    Intake/Output Summary (Last 24 hours) at 10/13/2024 0751  Last data filed at 10/12/2024 0822  Gross per 24 hour   Intake 270 ml   Output --   Net 270 ml     ABDOMEN:  soft, incisions C/D/I  Data  CBC:   Lab Results   Component Value Date/Time    WBC 14.0 10/12/2024 03:21 AM    RBC 3.70 10/12/2024 03:21 AM    HGB 12.1 10/12/2024 03:21 AM    HCT 36.1 10/12/2024 03:21 AM    MCV 97.6 10/12/2024 03:21 AM    MCH 32.7 10/12/2024 03:21 AM    MCHC 33.5 10/12/2024 03:21 AM    RDW 13.2 10/12/2024 03:21 AM     10/12/2024 03:21 AM    MPV 8.3 10/12/2024 03:21 AM     BMP:    Lab Results   Component Value Date/Time     10/12/2024 03:21 AM    K 3.5 10/12/2024 03:21 AM     10/12/2024 03:21 AM    CO2 21 10/12/2024 03:21 AM    BUN 13 10/12/2024 03:21 AM    CREATININE 0.74 10/12/2024 03:21 AM    CALCIUM 6.8 10/12/2024 03:21 AM    GFRAA >60 07/26/2022 01:56 PM    LABGLOM 86 10/12/2024 03:21 AM    GLUCOSE 108 10/12/2024 03:21 AM       Current Inpatient Medications    Current Facility-Administered Medications: calcium gluconate 2,000 mg in sodium chloride 100 mL, 2,000 mg, IntraVENous, Once **FOLLOWED BY** calcium gluconate 1,000 mg in sodium chloride 50 mL, 1,000 mg, IntraVENous, Once  rivaroxaban (XARELTO) tablet 20 mg, 20 mg, Oral, Daily  ketorolac (TORADOL) injection 15 mg, 15 mg, IntraVENous, Q6H  phenytoin (DILANTIN) ER capsule 200 mg, 200 mg, Oral, QPM  phenytoin (DILANTIN) ER capsule 100 mg, 100 mg, Oral, Daily  HYDROmorphone (DILAUDID) tablet 1 mg, 1 mg, Oral, Q4H PRN **AND** HYDROmorphone (DILAUDID) tablet 2 mg, 2 mg, Oral, Q4H PRN  HYDROmorphone (DILAUDID) injection 0.25 mg, 0.25 mg, IntraVENous, Once  HYDROmorphone HCl PF (DILAUDID) injection 0.25 mg, 0.25 mg, IntraVENous, Q3H PRN  propranolol (INDERAL) tablet 10 mg, 10 mg, Oral, 
PHYSICAL THERAPY EVALUATION/DISCHARGE    Patient: Keila Alcazra (74 y.o. female)  Date: 10/11/2024  Primary Diagnosis: Hiatal hernia [K44.9]  Abdominal pain, unspecified abdominal location [R10.9]  Procedure(s) (LRB):  ROBOT ASSISTED REPAIR OF RECURRENT PARAESOPHAGEAL HERNIA WITH NISSEN FUNDOPLICATION (N/A) 1 Day Post-Op   Precautions:                        ASSESSMENT AND RECOMMENDATIONS:  Based on the objective data described below, the patient presents with expected level of post-op pain, full AROM, good strength, steady dynamic balance and gait generally consistent with her baseline functional mobility level with post-op appropriate activity tolerance s/p Nissen fundoplication, POD 1.  Patient endorses being up ad hanna without concerns regarding mobility, is only limited by R shoulder pain attributed to surgical gas.  Trained patient in pacing and energy conservation strategies. Patient presents with no further needs in the acute setting, cleared to be up ad hanna.  Encouraged patient to be OOB 3x/day at mealtimes to prevent debility from prolonged bedrest.  Will complete orders.      Functional Outcome Measure:  The patient scored 24 on the [facility] outcome measure.    Further skilled acute physical therapy is not indicated at this time.       PLAN :  Recommendation for discharge: (in order for the patient to meet his/her long term goals):   No skilled physical therapy    Other factors to consider for discharge: no additional factors    IF patient discharges home will need the following DME: none       SUBJECTIVE:   Patient stated “I am usually very active, I don't sit still well.”    OBJECTIVE DATA SUMMARY:     Past Medical History:   Diagnosis Date    Chronic pain     rsd in left foot    GERD (gastroesophageal reflux disease)     Liver disease hx of several pe,s    Other ill-defined conditions(551.19)     essential tremors    Seasonal allergic rhinitis     Seizures (HCC)     2002    Stroke (HCC) 1990    
Patient interviewed and examined again.  No change to plan.    Jacob Lees MD  [facility]  Office:  [phone]  Fax:  [phone]    
Pt fall protocol  Yellow arm band on patient, yellow non skid socks on   bed in low position, all side rails up, call bell in reach  Pt  & family instructed in \"call don't fall\" protocol     Use your call bell,and wait for assistance, staff not family will assist you to get up &   move about  Pt & family verbalize understanding of fall precautions and \"call don't fall\" protocol   
Rapid Called at 0314    Responded to RRT at 0316 for Left side Chest Pain and L shoulder pain. Notes difficulty taking a deep breath.     Provider at bedside: YES. Dr. Martin  Interventions ordered: STAT Chest XR, Labs, and EKG. Placed on Remote Tele.  Sepsis Suspected: No  Transfer to Higher Level of Care: no  Blood Glucose: 116     Vitals:    10/11/24 0015   BP: 112/66   Pulse: 60   Resp: 16   Temp: 98.2 °F (36.8 °C)   SpO2: 96%        Rapid Ended at 0328  RRT RN assisted with transport to accepting unit NA.    Osmar Lorenzo, RN  
SURGERY PROGRESS NOTE      Admit Date: 10/7/2024    POD 1 Day Post-Op    Procedure: Procedure(s):  ESOPHAGOGASTRODUODENOSCOPY  ESOPHAGOGASTRODUODENOSCOPY BIOPSY  ESOPHAGOGASTRODUODENOSCOPY DILATION BALLOON      Subjective:   Patient seen and examined. Reports no improvement in symptoms after EGD. Continues to have vomiting and pain with liquids. Mobilizing.       Objective:     BP (!) 157/95 Comment: RN notified  Pulse 69   Temp 98.2 °F (36.8 °C) (Oral)   Resp 18   Ht 1.6 m (5' 2.99\")   Wt 79.4 kg (175 lb)   SpO2 95%   BMI 31.01 kg/m²      Temp (24hrs), Av.4 °F (36.9 °C), Min:97.5 °F (36.4 °C), Max:99.7 °F (37.6 °C)      Physical Exam:     General: NAD    Assessment:     Principal Problem:    Hiatal hernia  Resolved Problems:    * No resolved hospital problems. *      Plan/Recommendations/Medical Decision Making:   EGD with 15 cm hiatal hernia, esophageal ring dilated to 20 mm  Symptoms unchanged  NPO midnight for repair tomorrow AM  Sips as tolerates until then      Bridget Tong PA-C  
Spiritual Care Partner Volunteer visited patient at [facility] in [facility] MULTI-SPECIALTY ONCOLOGY 2 on 10/09/24    Documented on 10/10/24 by:  Chaplain Britany Mahmood MS, MDiv, [facility]  [facility]  Paging service: [phone] (MARIANNE)    
Still has substernal discomfort, improves with meds.  No appetite.    Vitals:    10/08/24 1121   BP: 135/70   Pulse: 67   Resp: 18   Temp: 98.6 °F (37 °C)   SpO2: 97%     Abdomen soft    HD2 recurrent PEH  Endoscopy today  OR Thursday, all questions answered.     Jacob Lees MD    
TRANSFER - OUT REPORT:    Verbal report given to Kia MONCADA on Keila Alcazar  being transferred to [facility] for routine progression of patient care       Report consisted of patient's Situation, Background, Assessment and   Recommendations(SBAR).     Information from the following report(s) Nurse Handoff Report and Recent Results was reviewed with the receiving nurse.           Lines:   Peripheral IV 10/08/24 Distal;Posterior;Right Forearm (Active)   Site Assessment Clean, dry & intact 10/08/24 1123   Line Status Blood return noted;Infusing;Flushed 10/08/24 1123   Line Care Connections checked and tightened 10/08/24 1123   Phlebitis Assessment No symptoms 10/08/24 1123   Infiltration Assessment 0 10/08/24 1123   Alcohol Cap Used Yes 10/08/24 1123   Dressing Status New dressing applied;Clean, dry & intact 10/08/24 1123   Dressing Type Transparent 10/08/24 1123   Dressing Intervention New 10/08/24 1123        Opportunity for questions and clarification was provided.      Patient transported with:       chart  
0.9 % sodium chloride infusion   IntraVENous PRN    potassium chloride (KLOR-CON) extended release tablet 40 mEq  40 mEq Oral PRN    Or    potassium bicarb-citric acid (EFFER-K) effervescent tablet 40 mEq  40 mEq Oral PRN    Or    potassium chloride 10 mEq/100 mL IVPB (Peripheral Line)  10 mEq IntraVENous PRN    magnesium sulfate 2000 mg in 50 mL IVPB premix  2,000 mg IntraVENous PRN    ondansetron (ZOFRAN-ODT) disintegrating tablet 4 mg  4 mg Oral Q8H PRN    Or    ondansetron (ZOFRAN) injection 4 mg  4 mg IntraVENous Q6H PRN    polyethylene glycol (GLYCOLAX) packet 17 g  17 g Oral Daily PRN    acetaminophen (TYLENOL) tablet 650 mg  650 mg Oral Q6H PRN    Or    acetaminophen (TYLENOL) suppository 650 mg  650 mg Rectal Q6H PRN    phenytoin (DILANTIN) injection 100 mg  100 mg IntraVENous Q8H    0.9 % sodium chloride infusion   IntraVENous Continuous    pantoprazole (PROTONIX) 40 mg in sodium chloride (PF) 0.9 % 10 mL injection  40 mg IntraVENous Q12H            Lab Review:     Recent Labs     10/07/24  0828 10/08/24  0330   WBC 6.6 7.1   HGB 13.6 12.4   HCT 41.2 37.7    299     Recent Labs     10/07/24  0828 10/08/24  0330   * 137   K 4.6 4.3    109*   CO2 27 27   BUN 6 8   ALT 15 14     No components found for: \"GLPOC\"      
bibasilar opacities. There is no  pneumothorax. The bones and upper abdomen are stable. A large hiatal hernia is  better seen on prior CT.     IMPRESSION:     Small pleural effusions with bibasilar opacities that may represent atelectasis,  edema, or infection.       Principal Problem:    Hiatal hernia  Active Problems:    Chest pain    GERD (gastroesophageal reflux disease)    Hx pulmonary embolism  Resolved Problems:    * No resolved hospital problems. *      Problem List Items Addressed This Visit          Digestive    * (Principal) Hiatal hernia     Other Visit Diagnoses       Abdominal pain, unspecified abdominal location    -  Primary

## 2025-05-08 NOTE — PROGRESS NOTES
Keila Alcazar is a 75 y.o. female who presents with the following  Chief Complaint   Patient presents with    Follow-up     Tremors      Last office visit note  HPI  Patient is here today for mixed tremor follow-up.  At the patient's last visit here, we decided to increase the patient's Rytary dose to 36. mg 3 times a day because the lower dose did not seem to be effective.  She was still having trouble with balance and feeling like she was veering to the side when she was walking, felt as if something was crawling on her.  Today the patient says that she did initially try to take 3 pills a day but had mood changes and felt lethargic with it.  She said that she felt like she did not care about anything when she was on the 3 pills a day.  She then moved the dose down to 2 pills a day but still felt lethargic- eventually, she moved the dose down to 1 pill a day which she takes about midday and feels like it is helpful for the tremor.  She still takes the propranolol 10 mg twice a day every morning.  She says that she sees a difference with her tremor where now if she holds out her hands in front of her she will see the tremor mostly in the left hand but when her hands are down at her side while walking she does not notice the tremor any longer.  She is happy with the way things are at this time and does not want to adjust the medication in any way.      She does have a large hiatal hernia that is really bothering her and will be having a endoscopy in October to find out if she can have surgery.    Patient will continue to take Rytary 36. mg daily as she cannot tolerate it more than once a day at this time.  Continue taking propranolol 10 mg twice a day.  Patient does have a pacemaker but we discussed focused ultrasound.  Did give her a brochure about it and suggested that she talk to her cardiologist to see if it be possible for her pacemaker to be turned off for the MRIs.  Patient will return in 6

## 2025-05-09 ENCOUNTER — OFFICE VISIT (OUTPATIENT)
Age: 75
End: 2025-05-09
Payer: MEDICARE

## 2025-05-09 VITALS
SYSTOLIC BLOOD PRESSURE: 132 MMHG | HEART RATE: 65 BPM | DIASTOLIC BLOOD PRESSURE: 80 MMHG | OXYGEN SATURATION: 97 % | RESPIRATION RATE: 20 BRPM

## 2025-05-09 DIAGNOSIS — R29.818 PARKINSONIAN FEATURES: Primary | ICD-10-CM

## 2025-05-09 DIAGNOSIS — R25.9 MIXED ACTION AND RESTING TREMOR: ICD-10-CM

## 2025-05-09 PROCEDURE — 1036F TOBACCO NON-USER: CPT

## 2025-05-09 PROCEDURE — 99215 OFFICE O/P EST HI 40 MIN: CPT

## 2025-05-09 PROCEDURE — 1126F AMNT PAIN NOTED NONE PRSNT: CPT

## 2025-05-09 PROCEDURE — 1160F RVW MEDS BY RX/DR IN RCRD: CPT

## 2025-05-09 PROCEDURE — 1090F PRES/ABSN URINE INCON ASSESS: CPT

## 2025-05-09 PROCEDURE — 1159F MED LIST DOCD IN RCRD: CPT

## 2025-05-09 PROCEDURE — G8417 CALC BMI ABV UP PARAM F/U: HCPCS

## 2025-05-09 PROCEDURE — G8427 DOCREV CUR MEDS BY ELIG CLIN: HCPCS

## 2025-05-09 PROCEDURE — 1123F ACP DISCUSS/DSCN MKR DOCD: CPT

## 2025-05-09 PROCEDURE — G8400 PT W/DXA NO RESULTS DOC: HCPCS

## 2025-05-09 PROCEDURE — 3017F COLORECTAL CA SCREEN DOC REV: CPT

## 2025-05-09 RX ORDER — RASAGILINE 0.5 MG/1
TABLET ORAL
Qty: 30 TABLET | Refills: 0 | Status: SHIPPED | OUTPATIENT
Start: 2025-05-09

## 2025-05-09 RX ORDER — RASAGILINE 1 MG/1
1 TABLET ORAL DAILY
Qty: 90 TABLET | Refills: 1 | Status: SHIPPED | OUTPATIENT
Start: 2025-05-30

## 2025-07-22 ENCOUNTER — ANESTHESIA EVENT (OUTPATIENT)
Facility: HOSPITAL | Age: 75
End: 2025-07-22
Payer: MEDICARE

## 2025-07-22 ENCOUNTER — ANESTHESIA (OUTPATIENT)
Facility: HOSPITAL | Age: 75
End: 2025-07-22
Payer: MEDICARE

## 2025-07-22 ENCOUNTER — HOSPITAL ENCOUNTER (OUTPATIENT)
Facility: HOSPITAL | Age: 75
Setting detail: OUTPATIENT SURGERY
Discharge: HOME | End: 2025-07-22
Attending: SPECIALIST | Admitting: SPECIALIST
Payer: MEDICARE

## 2025-07-22 VITALS
HEART RATE: 61 BPM | SYSTOLIC BLOOD PRESSURE: 119 MMHG | TEMPERATURE: 97.4 F | OXYGEN SATURATION: 98 % | RESPIRATION RATE: 21 BRPM | DIASTOLIC BLOOD PRESSURE: 70 MMHG | HEIGHT: 63 IN | WEIGHT: 158.07 LBS | BODY MASS INDEX: 28.01 KG/M2

## 2025-07-22 PROCEDURE — 2580000003 HC RX 258: Performed by: NURSE ANESTHETIST, CERTIFIED REGISTERED

## 2025-07-22 PROCEDURE — 3700000001 HC ADD 15 MINUTES (ANESTHESIA): Performed by: SPECIALIST

## 2025-07-22 PROCEDURE — 3600007502: Performed by: SPECIALIST

## 2025-07-22 PROCEDURE — 88305 TISSUE EXAM BY PATHOLOGIST: CPT

## 2025-07-22 PROCEDURE — 7100000010 HC PHASE II RECOVERY - FIRST 15 MIN: Performed by: SPECIALIST

## 2025-07-22 PROCEDURE — 3600007512: Performed by: SPECIALIST

## 2025-07-22 PROCEDURE — 6360000002 HC RX W HCPCS: Performed by: NURSE ANESTHETIST, CERTIFIED REGISTERED

## 2025-07-22 PROCEDURE — 3700000000 HC ANESTHESIA ATTENDED CARE: Performed by: SPECIALIST

## 2025-07-22 RX ORDER — PROPOFOL 10 MG/ML
INJECTION, EMULSION INTRAVENOUS
Status: DISCONTINUED | OUTPATIENT
Start: 2025-07-22 | End: 2025-07-22 | Stop reason: SDUPTHER

## 2025-07-22 RX ORDER — SODIUM CHLORIDE 0.9 % (FLUSH) 0.9 %
5-40 SYRINGE (ML) INJECTION PRN
Status: DISCONTINUED | OUTPATIENT
Start: 2025-07-22 | End: 2025-07-22 | Stop reason: HOSPADM

## 2025-07-22 RX ORDER — SODIUM CHLORIDE 9 MG/ML
INJECTION, SOLUTION INTRAVENOUS
Status: DISCONTINUED | OUTPATIENT
Start: 2025-07-22 | End: 2025-07-22 | Stop reason: SDUPTHER

## 2025-07-22 RX ORDER — SIMETHICONE 40MG/0.6ML
40 SUSPENSION, DROPS(FINAL DOSAGE FORM)(ML) ORAL AS NEEDED
Status: DISCONTINUED | OUTPATIENT
Start: 2025-07-22 | End: 2025-07-22 | Stop reason: HOSPADM

## 2025-07-22 RX ORDER — SODIUM CHLORIDE 9 MG/ML
INJECTION, SOLUTION INTRAVENOUS CONTINUOUS
Status: DISCONTINUED | OUTPATIENT
Start: 2025-07-22 | End: 2025-07-22 | Stop reason: HOSPADM

## 2025-07-22 RX ADMIN — PROPOFOL 180 MCG/KG/MIN: 10 INJECTION, EMULSION INTRAVENOUS at 09:28

## 2025-07-22 RX ADMIN — SODIUM CHLORIDE: 9 INJECTION, SOLUTION INTRAVENOUS at 08:51

## 2025-07-22 RX ADMIN — PROPOFOL 50 MG: 10 INJECTION, EMULSION INTRAVENOUS at 09:27

## 2025-07-22 ASSESSMENT — PAIN - FUNCTIONAL ASSESSMENT: PAIN_FUNCTIONAL_ASSESSMENT: 0-10

## 2025-07-22 ASSESSMENT — PAIN SCALES - GENERAL
PAINLEVEL_OUTOF10: 0
PAINLEVEL_OUTOF10: 0

## 2025-07-22 NOTE — OP NOTE
[facility]  [facility]  Romeo Ivory MD  [phone]      2025    Colonoscopy Procedure Note  Keila Alcazar  :  1950  [facility] Medical Record Number: [id number]    Indications:     Diarrhea, Personal history of colon polyps (screening only)  PCP:  Anca Velasquez MD  Anesthesia/Sedation: Conscious Sedation/Moderate Sedation/GETA, see notes  Endoscopist:  Dr. Romeo Ivory  Complications:  None  Estimated Blood Loss:  None    Permit:  The indications, risks, benefits and alternatives were reviewed with the patient or their decision maker who was provided an opportunity to ask questions and all questions were answered.  The specific risks of colonoscopy with conscious sedation were reviewed, including but not limited to anesthetic complication, bleeding, adverse drug reaction, missed lesion, infection, IV site reactions, and intestinal perforation which would lead to the need for surgical repair.  Alternatives to colonoscopy including radiographic imaging, observation without testing, or laboratory testing were reviewed including the limitations of those alternatives.  After considering the options and having all their questions answered, the patient or their decision maker provided both verbal and written consent to proceed.        Procedure in Detail:  After obtaining informed consent, positioning of the patient in the left lateral decubitus position, and conduction of a pre-procedure pause or \"time out\" the endoscope was introduced into the anus and advanced to the terminal ileum.  The quality of the colonic preparation was good.  A careful inspection was made as the colonoscope was withdrawn, findings and interventions are described below.    Findings:   Normal ileal and colonic mucosa throughout.  Random cold forceps biopsies taken for histology.      Specimens:    See above    Complications:   None;

## 2025-07-22 NOTE — H&P
Date: 2025 10:00 AM   Patient Name: Keila Alcazar   Account #: [id number]    Gender: Female    (age): 1950 (75)      Provider: Romeo Ivory MD      Referring Physician: [address], [place] [address] [phone] (phone)  [phone] (fax)      Chief Complaint: Diarrhea.           History of Present Illness:   75F with history DVT / PE none in years last seen here 2 months ago complaints diarrhea.  I suggested xifaxan for IBS-D.  She notes some improvement but symptoms persist.  \"better but not where I want to be\".  Describes sometimes urgent stool after merals, averaging 3/day.  No blood.  History of polyps, due for next colonsocopy  but we negotiated sooner exam with random biopsies to look for microscopic colitis.  If negative/normal biopsies then might suggest probiotics, might consider longer course xifaxan (would be problematic because not covered well by insurance), could consider scheduled immodium, could consider scheduled bismuth,  could consdier welchol (she did not tolerate questran/cholestyramine related to itching).      Past Medical History      Medical Conditions: Chest pain?  Pacemaker  Pulmonary embolism  Seizures  Shingles  Thyroid disease  Thyroid problems   Surgical Procedures: Gallbladder surgery, 2007  Appendectomy, 1965  History of anesthesia complications:   Dx Studies: CAT Scan,   Colonoscopy, 2022  Colonoscopy, 11  CT Abdomen, 2024, large hiatus hernia  Endoscopy, 10/08/2024   Medications: albuterol sulfate 0.63 mg/3 mL Inhale 1 puff by mouth every four hours as needed  Allegra 180 mg Take 1 tablet by mouth once a day  cetirizine 10 mg Take 1 tablet by mouth once a day  cholecalciferol (vitamin D3) 1,250 mcg (50,000 unit) Take 1 capsule by mouth once a week  Dilantin Extended 100 mg Take 1 capsule by mouth three times a day as directed  fexofenadine 180 mg Take 1 tablet by mouth once a day  levothyroxine 137

## 2025-07-22 NOTE — H&P
Pre-Endoscopy H&P Update  Chief complaint/HPI/ROS:  The indication for the procedure, the patient's history and the patient's current medications are reviewed prior to the procedure and that data is reported on the H&P to which this document is attached.  Any significant complaints with regard to organ systems will be noted, and if not mentioned then a review of systems is not contributory.  Past Medical History:   Diagnosis Date    Chronic pain     rsd in left foot    GERD (gastroesophageal reflux disease)     Liver disease hx of several pe,s    Other ill-defined conditions(799.89)     essential tremors    Seasonal allergic rhinitis     Seizures (HCC)     2002    Stroke (HCC) 1990    Thromboembolus (HCC)     pulmary embolism    Thyroid disease     hypothriodism      Past Surgical History:   Procedure Laterality Date    APPENDECTOMY      CHOLECYSTECTOMY, LAPAROSCOPIC      COLONOSCOPY N/A 4/22/2022    COLONOSCOPY performed by Romeo Ivory MD at [facility] ENDOSCOPY    GASTRIC FUNDOPLICATION N/A 10/10/2024    ROBOT ASSISTED REPAIR OF RECURRENT PARAESOPHAGEAL HERNIA WITH NISSEN FUNDOPLICATION performed by Jacob Lees MD at [facility] MAIN OR    GYN      cyst on ovaries    HEENT      tonsils    HERNIA REPAIR  2012    hiatal hernia repair    JW    PACEMAKER  07/2021    JW or Chip   Metronic    Dr. Mack    UPPER GASTROINTESTINAL ENDOSCOPY N/A 10/8/2024    ESOPHAGOGASTRODUODENOSCOPY performed by Romeo Ivory MD at [facility] ENDOSCOPY    UPPER GASTROINTESTINAL ENDOSCOPY N/A 10/8/2024    ESOPHAGOGASTRODUODENOSCOPY BIOPSY performed by Romeo Ivory MD at [facility] ENDOSCOPY    UPPER GASTROINTESTINAL ENDOSCOPY  10/8/2024    ESOPHAGOGASTRODUODENOSCOPY DILATION BALLOON performed by Romeo Ivroy MD at [facility] ENDOSCOPY     Social   Social History     Tobacco Use    Smoking status: Never    Smokeless tobacco: Never   Substance Use Topics    Alcohol use: No      Family History   Problem Relation Age of Onset    Heart

## 2025-07-22 NOTE — ANESTHESIA PRE PROCEDURE
Department of Anesthesiology  Preprocedure Note       Name:  Keila Alcazar   Age:  75 y.o.  :  1950                                          MRN:  [id number]         Date:  2025      Surgeon: Surgeon(s):  Romeo Ivory MD    Procedure: Procedure(s):  COLONOSCOPY    Medications prior to admission:   Prior to Admission medications    Medication Sig Start Date End Date Taking? Authorizing Provider   rasagiline (AZILECT) 0.5 MG TABS Take 1 pill daily for 30 days. Then increase the dose to 1 mg daily. 25  Yes Gina Cline APRN - NP   rasagiline mesylate 1 MG TABS Take 1 tablet by mouth daily 25  Yes Gina Cline APRN - NP   levothyroxine (SYNTHROID) 137 MCG tablet Take 1 tablet by mouth every morning (before breakfast)   Yes Provider, MD Abran   Calcium Carbonate-Vitamin D (OYSTER SHELL CALCIUM/D) 500-5 MG-MCG TABS Take 1 tablet by mouth daily   Yes Automatic Reconciliation, Ar   fexofenadine (ALLEGRA) 180 MG tablet Take 1 tablet by mouth daily   Yes Automatic Reconciliation, Ar   phenytoin (DILANTIN) 100 MG ER capsule Take 1 capsule by mouth 2 times daily   Yes Automatic Reconciliation, Ar   propranolol (INDERAL) 10 MG tablet Take 1 tablet by mouth 2 times daily   Yes Automatic Reconciliation, Ar   montelukast (SINGULAIR) 10 MG tablet Take 1 tablet by mouth every evening    Automatic Reconciliation, Ar   omeprazole (PRILOSEC) 10 MG delayed release capsule Take 1 capsule by mouth 2 times daily  Patient not taking: Reported on 2025    Automatic Reconciliation, Ar   rivaroxaban (XARELTO) 20 MG TABS tablet Take by mouth daily    Automatic Reconciliation, Ar       Current medications:    Current Facility-Administered Medications   Medication Dose Route Frequency Provider Last Rate Last Admin   • 0.9 % sodium chloride infusion   IntraVENous Continuous Romeo Ivory MD       • sodium chloride flush 0.9 % injection 5-40 mL  5-40 mL IntraVENous PRN Romeo Ivory MD

## 2025-07-22 NOTE — DISCHARGE INSTRUCTIONS
[facility]  [facility]  Romeo Reis MD  [phone]      July 22, 2025    Keila Alcazar  YOB: 1950    COLONOSCOPY DISCHARGE INSTRUCTIONS    If there is redness at IV site you should apply warm compress to area.  If redness or soreness persist contact Dr. Reis' or your primary care doctor.    There may be a slight amount of blood passed from the rectum.  Gaseous discomfort may develop, but walking, belching will help relieve this.  You may not operate a vehicle for 12 hours  You may not operate machinery or dangerous appliances for rest of today  You may not drink alcoholic beverages for 12 hours  Avoid making any critical decisions for 24 hours    DIET:  You may resume your normal diet, but some patients find that heavy or large meals may lead to indigestion or vomiting.  I suggest a light meal as first food intake.    MEDICATIONS:  The use of some over-the-counter pain medication may lead to bleeding after colon biopsies or polyp removal.  Tylenol (also called acetaminophen) is safe to take even if you have had colonoscopy with polyp removal.  Based on the procedure you had today you may not safely take aspirin or aspirin-like products for the next ten (10) days.  Remember that Tylenol (also called acetaminophen) is safe to take after colonoscopy even if you have had biopsies or polyps removed.    ACTIVITY:  You may resume your normal household activities, but it is recommended that you spend the remainder of the day resting -  avoid any strenuous activity.    CALL DR. REIS' OFFICE IF:  Increasing pain, nausea, vomiting  Abdominal distension (swelling)  Significant new or increased bleeding (oral or rectal)  Fever/Chills  Chest pain/shortness of breath                       Additional instructions:   Great news, no colon cancer and no colon polyps.  I took samples of what looks normal to make sure the colon is

## 2025-08-11 DIAGNOSIS — R29.818 PARKINSONIAN FEATURES: ICD-10-CM

## 2025-08-12 RX ORDER — RASAGILINE 1 MG/1
1 TABLET ORAL DAILY
Qty: 90 TABLET | Refills: 1 | Status: SHIPPED | OUTPATIENT
Start: 2025-08-12

## 2025-08-22 ENCOUNTER — OFFICE VISIT (OUTPATIENT)
Age: 75
End: 2025-08-22
Payer: MEDICARE

## 2025-08-22 VITALS
RESPIRATION RATE: 20 BRPM | DIASTOLIC BLOOD PRESSURE: 78 MMHG | HEART RATE: 78 BPM | SYSTOLIC BLOOD PRESSURE: 126 MMHG | OXYGEN SATURATION: 95 %

## 2025-08-22 DIAGNOSIS — R25.9 MIXED ACTION AND RESTING TREMOR: ICD-10-CM

## 2025-08-22 DIAGNOSIS — Z87.898 HISTORY OF SEIZURE: ICD-10-CM

## 2025-08-22 DIAGNOSIS — R29.818 PARKINSONIAN FEATURES: Primary | ICD-10-CM

## 2025-08-22 DIAGNOSIS — R41.3 MEMORY DIFFICULTIES: ICD-10-CM

## 2025-08-22 PROCEDURE — 95816 EEG AWAKE AND DROWSY: CPT

## 2025-08-22 PROCEDURE — 99215 OFFICE O/P EST HI 40 MIN: CPT

## (undated) DEVICE — SOLIDIFIER MEDC 1200ML -- CONVERT TO 356117

## (undated) DEVICE — PENROSE TUBING RADIOPAQUE: Brand: ARGYLE

## (undated) DEVICE — KIT COLON W/ 1.1OZ LUB AND 2 END

## (undated) DEVICE — VESSEL SEALER EXTEND: Brand: ENDOWRIST

## (undated) DEVICE — ESOPHAGEAL BALLOON DILATATION CATHETER: Brand: CRE FIXED WIRE

## (undated) DEVICE — Device

## (undated) DEVICE — ROBOTIC GENERAL-SFMC: Brand: MEDLINE INDUSTRIES, INC.

## (undated) DEVICE — SOLUTION IRRIG 1000ML 0.9% SOD CHL USP POUR PLAS BTL

## (undated) DEVICE — SET ADMIN 16ML TBNG L100IN 2 Y INJ SITE IV PIGGY BK DISP (ORDER IN MULIPLES OF 48)

## (undated) DEVICE — SYRINGE MEDICAL 3ML CLEAR PLASTIC STANDARD NON CONTROL LUERLOCK TIP DISPOSABLE

## (undated) DEVICE — CATH IV AUTOGRD BC PNK 20GA 25 -- INSYTE

## (undated) DEVICE — 1200 GUARD II KIT W/5MM TUBE W/O VAC TUBE: Brand: GUARDIAN

## (undated) DEVICE — CONTAINER SPEC 20 ML LID NEUT BUFF FORMALIN 10 % POLYPR STS

## (undated) DEVICE — SYR 5ML 1/5 GRAD LL NSAF LF --

## (undated) DEVICE — BLADELESS OBTURATOR: Brand: WECK VISTA

## (undated) DEVICE — SEAL

## (undated) DEVICE — SYRINGE INFL 60ML DISP ALLIANCE II

## (undated) DEVICE — CANNULA CUSH AD W/ 14FT TBG

## (undated) DEVICE — SYRINGE MED 5ML STD CLR PLAS LUERLOCK TIP N CTRL DISP

## (undated) DEVICE — CANISTER, RIGID, 3000CC: Brand: MEDLINE INDUSTRIES, INC.

## (undated) DEVICE — 1LYRTR 16FR10ML100%SIL UMS SNP: Brand: MEDLINE INDUSTRIES, INC.

## (undated) DEVICE — DISPOSABLE GRASPER CARTRIDGE: Brand: DIRECT DRIVE REPOSABLE GRASPERS

## (undated) DEVICE — AIRSEAL 8 MM ACCESS PORT AND LOW PROFILE OBTURATOR WITH BLADELESS OPTICAL TIP, 120 MM LENGTH: Brand: AIRSEAL

## (undated) DEVICE — SUTURE MONOCRYL SZ 4-0 L27IN ABSRB UD L19MM PS-2 1/2 CIR PRIM Y426H

## (undated) DEVICE — BLUNTFILL WITH FILTER: Brand: MONOJECT

## (undated) DEVICE — SYR 3ML LL TIP 1/10ML GRAD --

## (undated) DEVICE — CATHETER IV 22GA L1IN OD0.8382-0.9144MM ID0.6096-0.6858MM 382523

## (undated) DEVICE — TIP COVER ACCESSORY

## (undated) DEVICE — IV STRT KT 3282] LSL INDUSTRIES INC]

## (undated) DEVICE — ELECTRODE,RADIOTRANSLUCENT,FOAM,3PK: Brand: MEDLINE

## (undated) DEVICE — BLUNTFILL: Brand: MONOJECT

## (undated) DEVICE — ARM DRAPE

## (undated) DEVICE — SNARE ENDOSCP M L240CM W27MM SHTH DIA2.4MM CHN 2.8MM OVL

## (undated) DEVICE — CUFF RMFG BP INF SZ 11 DISP -- LAWSON OEM ITEM 238915

## (undated) DEVICE — 3M™ CUROS™ DISINFECTING CAP FOR NEEDLELESS CONNECTORS 270/CARTON 20 CARTONS/CASE CFF1-270: Brand: CUROS™

## (undated) DEVICE — TRI-LUMEN FILTERED TUBE SET WITH ACTIVATED CHARCOAL FILTER: Brand: AIRSEAL

## (undated) DEVICE — (D)SENSOR RMFG 02 PULS OXMTR -- DISC BY MFR USE ITEM 133445

## (undated) DEVICE — 3M™ IOBAN™ 2 ANTIMICROBIAL INCISE DRAPE 6650EZ: Brand: IOBAN™ 2

## (undated) DEVICE — GLOVE ORTHO 8   MSG9480

## (undated) DEVICE — CANN NASAL O2 CAPNOGRAPHY AD -- FILTERLINE

## (undated) DEVICE — SOLUTION IRRIG 1000ML STRL H2O USP PLAS POUR BTL

## (undated) DEVICE — BITEBLOCK ENDOSCP 60FR MAXI WHT POLYETH STURDY W/ VELC WVN

## (undated) DEVICE — BAG SPEC BIOHZRD 10 X 10 IN --

## (undated) DEVICE — BAG BELONG PT PERS CLEAR HANDL

## (undated) DEVICE — POLYP TRAP: Brand: TRAPEASE®

## (undated) DEVICE — SET GRAV CK VLV NEEDLESS ST 3 GANGED 4WAY STPCOCK HI FLO 10

## (undated) DEVICE — SOLIDIFIER FLD 2OZ 1500CC N DISINF IN BTL DISP SAFESORB

## (undated) DEVICE — LIQUIBAND RAPID ADHESIVE 36/CS 0.8ML: Brand: MEDLINE

## (undated) DEVICE — SOLUTION IV 1000ML 0.9% SOD CHL PH 5 INJ USP VIAFLX PLAS

## (undated) DEVICE — SUTURE ETHIBOND EXCEL SZ 0 L36IN NONABSORBABLE GRN SH L26MM X524H

## (undated) DEVICE — SIMPLICITY FLUFF UNDERPAD 23X36, MODERATE: Brand: SIMPLICITY